# Patient Record
Sex: MALE | Race: WHITE | NOT HISPANIC OR LATINO | Employment: OTHER | URBAN - METROPOLITAN AREA
[De-identification: names, ages, dates, MRNs, and addresses within clinical notes are randomized per-mention and may not be internally consistent; named-entity substitution may affect disease eponyms.]

---

## 2017-04-28 ENCOUNTER — OFFICE VISIT (OUTPATIENT)
Dept: NEUROSURGERY | Facility: CLINIC | Age: 62
End: 2017-04-28

## 2017-04-28 VITALS
SYSTOLIC BLOOD PRESSURE: 150 MMHG | DIASTOLIC BLOOD PRESSURE: 90 MMHG | HEART RATE: 53 BPM | HEIGHT: 68 IN | BODY MASS INDEX: 24.71 KG/M2 | WEIGHT: 163 LBS

## 2017-04-28 DIAGNOSIS — M25.551 CHRONIC RIGHT HIP PAIN: Primary | ICD-10-CM

## 2017-04-28 DIAGNOSIS — G89.29 CHRONIC RIGHT HIP PAIN: Primary | ICD-10-CM

## 2017-04-28 DIAGNOSIS — M51.37 DEGENERATION OF LUMBAR OR LUMBOSACRAL INTERVERTEBRAL DISC: ICD-10-CM

## 2017-04-28 PROBLEM — M51.379 DEGENERATION OF LUMBAR OR LUMBOSACRAL INTERVERTEBRAL DISC: Status: ACTIVE | Noted: 2017-04-28

## 2017-04-28 PROCEDURE — 99213 OFFICE O/P EST LOW 20 MIN: CPT | Performed by: PHYSICIAN ASSISTANT

## 2017-04-28 NOTE — PROGRESS NOTES
Subjective   Patient ID: Duane Hemphill is a 61 y.o. male is here today for follow-up for back pain. He is 6 months out from an right L4-5 lumbar decompression by Dr. Rasheed 10/3/16.  He denies any recent cause or injury.  He complains of constant back and right hip pain. He is not taking any pain medications at this time.     Back Pain   This is a chronic problem. The current episode started more than 1 year ago. The problem has been gradually worsening since onset. The quality of the pain is described as aching. The pain radiates to the right thigh. The pain is at a severity of 8/10. The pain is severe. The pain is worse during the night. The symptoms are aggravated by position. Stiffness is present at night. Pertinent negatives include no bladder incontinence, bowel incontinence, fever, leg pain, numbness, tingling or weakness. He has tried NSAIDs, walking, home exercises, heat and analgesics for the symptoms. The treatment provided no relief.   Hip Pain    There was no injury mechanism. The pain is present in the right hip. The pain is at a severity of 7/10. The pain is moderate. The pain has been constant since onset. Pertinent negatives include no numbness or tingling. He has tried nothing for the symptoms.       The following portions of the patient's history were reviewed and updated as appropriate: allergies, current medications, past family history, past medical history, past social history, past surgical history and problem list.    Review of Systems   Constitutional: Negative for fever.   Gastrointestinal: Negative for bowel incontinence.   Genitourinary: Negative for bladder incontinence and difficulty urinating.   Musculoskeletal: Positive for back pain (right hip). Negative for gait problem.   Neurological: Negative for tingling, weakness and numbness.   All other systems reviewed and are negative.      Objective   Physical Exam   Constitutional: He is oriented to person, place, and time. He  appears well-developed and well-nourished.   HENT:   Head: Normocephalic and atraumatic.   Right Ear: External ear normal.   Left Ear: External ear normal.   Eyes: Conjunctivae and EOM are normal. Pupils are equal, round, and reactive to light. Right eye exhibits no discharge. Left eye exhibits no discharge.   Neck: Normal range of motion. Neck supple. No tracheal deviation present.   Pulmonary/Chest: Effort normal. No stridor. No respiratory distress.   Musculoskeletal: Normal range of motion. He exhibits no edema, tenderness or deformity.   Positive Dayton's sign on R side   Neurological: He is alert and oriented to person, place, and time. He has normal strength and normal reflexes. He displays no atrophy, no tremor and normal reflexes. No cranial nerve deficit or sensory deficit. He exhibits normal muscle tone. He displays a negative Romberg sign. He displays no seizure activity. Coordination and gait normal.   No long tract signs   Skin: Skin is warm and dry.   Psychiatric: He has a normal mood and affect. His behavior is normal. Judgment and thought content normal.   Nursing note and vitals reviewed.    Neurologic Exam     Mental Status   Oriented to person, place, and time.     Cranial Nerves     CN III, IV, VI   Pupils are equal, round, and reactive to light.  Extraocular motions are normal.     Motor Exam     Strength   Strength 5/5 throughout.       Assessment/Plan   Independent Review of Radiographic Studies:      Medical Decision Making:    Mr. Hemphill underwent a previous open right L4-L5 decompression with Dr. Rasheed.  He has always had chronic low back pain that has failed to improve over the years with therapy or epidurals.  He also reports chronic right groin pain that began after a right proximal femur fracture about 4 years ago.  He had surgery with Dr. Freeman and has had subsequent follow-up visits where he was reassured that the hardware looked okay but the patient continues to complain of  pain.  He no longer has any radicular pain like he had prior to the lumbar decompression.  He denies any weakness or incontinence but came back today because the back and groin pain continue to limit his activity.    I will send him for a new L spine MRI and XR.  I will also send him for a 2nd ortho opinion for his R hip pain. F/u thereafter.     Duane was seen today for back pain and hip pain.    Diagnoses and all orders for this visit:    Chronic right hip pain  -     Ambulatory Referral to Orthopedic Surgery  -     Basic Metabolic Panel    Degeneration of lumbar or lumbosacral intervertebral disc  -     MRI Lumbar Spine With & Without Contrast; Future  -     XR Spine Lumbar Complete With Flex & Ext; Future  -     Basic Metabolic Panel    Return for follow up after radiology test with Dr. Rasheed.

## 2017-05-24 ENCOUNTER — OFFICE VISIT (OUTPATIENT)
Dept: ORTHOPEDIC SURGERY | Facility: CLINIC | Age: 62
End: 2017-05-24

## 2017-05-24 DIAGNOSIS — M51.37 DEGENERATION OF LUMBAR OR LUMBOSACRAL INTERVERTEBRAL DISC: ICD-10-CM

## 2017-05-24 DIAGNOSIS — G89.29 CHRONIC RIGHT HIP PAIN: ICD-10-CM

## 2017-05-24 DIAGNOSIS — M25.551 CHRONIC RIGHT HIP PAIN: ICD-10-CM

## 2017-05-24 DIAGNOSIS — S72.001S: ICD-10-CM

## 2017-05-24 DIAGNOSIS — M25.551 HIP PAIN, RIGHT: Primary | ICD-10-CM

## 2017-05-24 PROCEDURE — 73502 X-RAY EXAM HIP UNI 2-3 VIEWS: CPT | Performed by: ORTHOPAEDIC SURGERY

## 2017-05-24 PROCEDURE — 99214 OFFICE O/P EST MOD 30 MIN: CPT | Performed by: ORTHOPAEDIC SURGERY

## 2017-05-24 RX ORDER — TRAMADOL HYDROCHLORIDE 50 MG/1
50 TABLET ORAL NIGHTLY PRN
Qty: 30 TABLET | Refills: 0 | Status: SHIPPED | OUTPATIENT
Start: 2017-05-24 | End: 2017-07-03

## 2017-07-03 ENCOUNTER — OFFICE VISIT (OUTPATIENT)
Dept: NEUROSURGERY | Facility: CLINIC | Age: 62
End: 2017-07-03

## 2017-07-03 VITALS
HEART RATE: 56 BPM | WEIGHT: 168 LBS | DIASTOLIC BLOOD PRESSURE: 94 MMHG | HEIGHT: 68 IN | SYSTOLIC BLOOD PRESSURE: 169 MMHG | BODY MASS INDEX: 25.46 KG/M2

## 2017-07-03 DIAGNOSIS — G89.29 CHRONIC RIGHT HIP PAIN: Primary | ICD-10-CM

## 2017-07-03 DIAGNOSIS — M25.551 CHRONIC RIGHT HIP PAIN: Primary | ICD-10-CM

## 2017-07-03 DIAGNOSIS — M48.061 STENOSIS, SPINAL, LUMBAR: ICD-10-CM

## 2017-07-03 PROCEDURE — 99213 OFFICE O/P EST LOW 20 MIN: CPT | Performed by: NEUROLOGICAL SURGERY

## 2017-07-03 RX ORDER — GABAPENTIN 600 MG/1
600 TABLET ORAL 2 TIMES DAILY
Qty: 180 TABLET | Refills: 1
Start: 2017-07-03 | End: 2017-09-08 | Stop reason: SDUPTHER

## 2017-07-03 NOTE — PATIENT INSTRUCTIONS
Steps to Quit Smoking   Smoking tobacco can be harmful to your health and can affect almost every organ in your body. Smoking puts you, and those around you, at risk for developing many serious chronic diseases. Quitting smoking is difficult, but it is one of the best things that you can do for your health. It is never too late to quit.  WHAT ARE THE BENEFITS OF QUITTING SMOKING?  When you quit smoking, you lower your risk of developing serious diseases and conditions, such as:  · Lung cancer or lung disease, such as COPD.  · Heart disease.  · Stroke.  · Heart attack.  · Infertility.  · Osteoporosis and bone fractures.  Additionally, symptoms such as coughing, wheezing, and shortness of breath may get better when you quit. You may also find that you get sick less often because your body is stronger at fighting off colds and infections. If you are pregnant, quitting smoking can help to reduce your chances of having a baby of low birth weight.  HOW DO I GET READY TO QUIT?  When you decide to quit smoking, create a plan to make sure that you are successful. Before you quit:  · Pick a date to quit. Set a date within the next two weeks to give you time to prepare.  · Write down the reasons why you are quitting. Keep this list in places where you will see it often, such as on your bathroom mirror or in your car or wallet.  · Identify the people, places, things, and activities that make you want to smoke (triggers) and avoid them. Make sure to take these actions:    Throw away all cigarettes at home, at work, and in your car.    Throw away smoking accessories, such as ashtrays and lighters.    Clean your car and make sure to empty the ashtray.    Clean your home, including curtains and carpets.  · Tell your family, friends, and coworkers that you are quitting. Support from your loved ones can make quitting easier.  · Talk with your health care provider about your options for quitting smoking.  · Find out what treatment  "options are covered by your health insurance.  WHAT STRATEGIES CAN I USE TO QUIT SMOKING?   Talk with your healthcare provider about different strategies to quit smoking. Some strategies include:  · Quitting smoking altogether instead of gradually lessening how much you smoke over a period of time. Research shows that quitting \"cold turkey\" is more successful than gradually quitting.  · Attending in-person counseling to help you build problem-solving skills. You are more likely to have success in quitting if you attend several counseling sessions. Even short sessions of 10 minutes can be effective.  · Finding resources and support systems that can help you to quit smoking and remain smoke-free after you quit. These resources are most helpful when you use them often. They can include:    Online chats with a counselor.    Telephone quitlines.    Printed self-help materials.    Support groups or group counseling.    Text messaging programs.    Mobile phone applications.  · Taking medicines to help you quit smoking. (If you are pregnant or breastfeeding, talk with your health care provider first.) Some medicines contain nicotine and some do not. Both types of medicines help with cravings, but the medicines that include nicotine help to relieve withdrawal symptoms. Your health care provider may recommend:    Nicotine patches, gum, or lozenges.    Nicotine inhalers or sprays.    Non-nicotine medicine that is taken by mouth.  Talk with your health care provider about combining strategies, such as taking medicines while you are also receiving in-person counseling. Using these two strategies together makes you more likely to succeed in quitting than if you used either strategy on its own.  If you are pregnant or breastfeeding, talk with your health care provider about finding counseling or other support strategies to quit smoking. Do not take medicine to help you quit smoking unless told to do so by your health care " provider.  WHAT THINGS CAN I DO TO MAKE IT EASIER TO QUIT?  Quitting smoking might feel overwhelming at first, but there is a lot that you can do to make it easier. Take these important actions:  · Reach out to your family and friends and ask that they support and encourage you during this time. Call telephone quitlines, reach out to support groups, or work with a counselor for support.  · Ask people who smoke to avoid smoking around you.  · Avoid places that trigger you to smoke, such as bars, parties, or smoke-break areas at work.  · Spend time around people who do not smoke.  · Lessen stress in your life, because stress can be a smoking trigger for some people. To lessen stress, try:    Exercising regularly.    Deep-breathing exercises.    Yoga.    Meditating.    Performing a body scan. This involves closing your eyes, scanning your body from head to toe, and noticing which parts of your body are particularly tense. Purposefully relax the muscles in those areas.  · Download or purchase mobile phone or tablet apps (applications) that can help you stick to your quit plan by providing reminders, tips, and encouragement. There are many free apps, such as QuitGuide from the CDC (Centers for Disease Control and Prevention). You can find other support for quitting smoking (smoking cessation) through smokefree.gov and other websites.  HOW WILL I FEEL WHEN I QUIT SMOKING?  Within the first 24 hours of quitting smoking, you may start to feel some withdrawal symptoms. These symptoms are usually most noticeable 2-3 days after quitting, but they usually do not last beyond 2-3 weeks. Changes or symptoms that you might experience include:  · Mood swings.  · Restlessness, anxiety, or irritation.  · Difficulty concentrating.  · Dizziness.  · Strong cravings for sugary foods in addition to nicotine.  · Mild weight gain.  · Constipation.  · Nausea.  · Coughing or a sore throat.  · Changes in how your medicines work in your  body.  · A depressed mood.  · Difficulty sleeping (insomnia).  After the first 2-3 weeks of quitting, you may start to notice more positive results, such as:  · Improved sense of smell and taste.  · Decreased coughing and sore throat.  · Slower heart rate.  · Lower blood pressure.  · Clearer skin.  · The ability to breathe more easily.  · Fewer sick days.  Quitting smoking is very challenging for most people. Do not get discouraged if you are not successful the first time. Some people need to make many attempts to quit before they achieve long-term success. Do your best to stick to your quit plan, and talk with your health care provider if you have any questions or concerns.     This information is not intended to replace advice given to you by your health care provider. Make sure you discuss any questions you have with your health care provider.     Document Released: 12/12/2002 Document Revised: 05/03/2016 Document Reviewed: 05/03/2016  [employer] Interactive Patient Education ©2017 [employer]

## 2017-07-03 NOTE — PROGRESS NOTES
Subjective   Patient ID: Duane Hemphill is a 61 y.o. male is here today for follow-up of back pain after right L4-5 lumbar decompression on 10/3/16 with new MRI of the lumbar spine.    At the patient's last visit, due to his back and right hip pain, new MRI of the lumbar spine was ordered and the patient was given a referral to PT.    Today, the patient notes that despite the PT, his pain symptoms are unchanged.  He continues to have a lot of pain in his back and right hip.    The patient notes that he has spoken with two orthopedic surgeons and was told that his symptoms are not coming from his right hip.    Back Pain   This is a chronic problem. The current episode started more than 1 year ago. The problem occurs constantly. The problem has been gradually worsening since onset. The pain is present in the gluteal and sacro-iliac. The quality of the pain is described as aching, burning, cramping, shooting and stabbing. The pain radiates to the right thigh. The pain is at a severity of 8/10. The pain is the same all the time. The symptoms are aggravated by bending, lying down, sitting, standing, stress and twisting. Stiffness is present all day. Associated symptoms include pelvic pain and weakness. Pertinent negatives include no abdominal pain, bladder incontinence, bowel incontinence, chest pain, dysuria, fever, headaches, leg pain, numbness, paresis, paresthesias, perianal numbness, tingling or weight loss.       The following portions of the patient's history were reviewed and updated as appropriate: allergies, current medications, past family history, past medical history, past social history, past surgical history and problem list.    Review of Systems   Constitutional: Negative for fever and weight loss.   Cardiovascular: Negative for chest pain.   Gastrointestinal: Negative for abdominal pain and bowel incontinence.   Genitourinary: Positive for pelvic pain. Negative for bladder incontinence and dysuria.    Musculoskeletal: Positive for back pain.   Neurological: Positive for weakness. Negative for tingling, numbness, headaches and paresthesias.   All other systems reviewed and are negative.    He is with his wife.  He had a right open L4-L5 decompression done about 9 months ago.  He did quite well but he began having some right upper buttock pain.  Initially was thought that he could be related to some previous hip surgery that he had 12 years ago.  But he has seen his orthopedic surgeon and another one who think that the hip is fine.  The pain doesn't radiate down the leg like it used to.  It's in the upper buttock predominantly.  Bending forward does seem to make it worse, leaning backward does not.  He doesn't have any motor deficits.  He still actually doesn't have that much low back pain and the left leg is fine.  We reviewed the MRI.  Is not clear cut.  There could be some recurrent stenosis under imaged on the MRI.  He has seen another orthopedic surgeon who agreed with his primary orthopedic surgeon.  I think it's reasonable to assume as the spine for the moment and try epidural blocks.  He had been on gabapentin which I gave him.  I told him to resume it.  His wife will let us know the precise dose.  If that doesn't work, then we will proceed with myelography.  But if we do another surgery and it is at L4-L5, he would probably need to be fused which is a bigger surgery.  I told him as such.        Objective   Physical Exam   Constitutional: He is oriented to person, place, and time. He appears well-developed and well-nourished.   HENT:   Head: Normocephalic and atraumatic.   Eyes: Conjunctivae and EOM are normal. Pupils are equal, round, and reactive to light.   Fundoscopic exam:       The right eye shows no papilledema. The right eye shows venous pulsations.        The left eye shows no papilledema. The left eye shows venous pulsations.   Neck: Carotid bruit is not present.   Neurological: He is oriented  to person, place, and time. He has a normal Finger-Nose-Finger Test and a normal Heel to Shin Test. Gait normal.   Reflex Scores:       Tricep reflexes are 2+ on the right side and 2+ on the left side.       Bicep reflexes are 2+ on the right side and 2+ on the left side.       Brachioradialis reflexes are 2+ on the right side and 2+ on the left side.       Patellar reflexes are 2+ on the right side and 2+ on the left side.       Achilles reflexes are 2+ on the right side and 2+ on the left side.  Psychiatric: His speech is normal.     Neurologic Exam     Mental Status   Oriented to person, place, and time.   Registration of memory: Good recent and remote memory.   Attention: normal. Concentration: normal.   Speech: speech is normal   Level of consciousness: alert  Knowledge: consistent with education.     Cranial Nerves     CN II   Visual fields full to confrontation.   Visual acuity: normal    CN III, IV, VI   Pupils are equal, round, and reactive to light.  Extraocular motions are normal.     CN V   Facial sensation intact.   Right corneal reflex: normal  Left corneal reflex: normal    CN VII   Facial expression full, symmetric.   Right facial weakness: none  Left facial weakness: none    CN VIII   Hearing: intact    CN IX, X   Palate: symmetric    CN XI   Right sternocleidomastoid strength: normal  Left sternocleidomastoid strength: normal    CN XII   Tongue: not atrophic  Tongue deviation: none    Motor Exam   Muscle bulk: normal  Right arm tone: normal  Left arm tone: normal  Right leg tone: normal  Left leg tone: normal    Strength   Strength 5/5 except as noted.     Sensory Exam   Light touch normal.     Gait, Coordination, and Reflexes     Gait  Gait: normal    Coordination   Finger to nose coordination: normal  Heel to shin coordination: normal    Reflexes   Right brachioradialis: 2+  Left brachioradialis: 2+  Right biceps: 2+  Left biceps: 2+  Right triceps: 2+  Left triceps: 2+  Right patellar: 2+  Left  patellar: 2+  Right achilles: 2+  Left achilles: 2+  Right : 2+  Left : 2+      Assessment/Plan   Independent Review of Radiographic Studies:    I reviewed a lumbar MRI which shows postoperative changes on the right at L4-L5.  Perhaps some degree of residual stenosis at L4-L5.  Significant nerve compression in other places.  X-rays don't show any obvious instability.      Medical Decision Making:    I'm not actually certain his pain in the right buttock is coming from the spine.  The MRI evidence isn't convincing.  We will assume that it is for the moment and try epidural blocks again.  He will restart the gabapentin.  His wife will let us know what the dose is.  He had gotten it from me in the past.  We'll see him in 2 months.  If there is no improvement, then we'll proceed with myelography and see if there is any more significant stenosis is not seen on the MRI.      Duane was seen today for back pain.    Diagnoses and all orders for this visit:    Chronic right hip pain    Stenosis, spinal, lumbar  -     Epidural Block    Return in about 2 months (around 9/3/2017).    I advised the patient of the risks in continuing to use tobacco, and I provided this patient with smoking cessation educational materials.  I also discussed how to quit smoking and the patient has expressed the willingness to quit.      During this visit, I spent less than 3 minutes counseling the patient regarding smoking cessation.

## 2017-07-20 ENCOUNTER — HOSPITAL ENCOUNTER (OUTPATIENT)
Dept: GENERAL RADIOLOGY | Facility: HOSPITAL | Age: 62
Discharge: HOME | End: 2017-07-20

## 2017-07-20 ENCOUNTER — HOSPITAL ENCOUNTER (OUTPATIENT)
Dept: PAIN MEDICINE | Facility: HOSPITAL | Age: 62
Discharge: HOME | End: 2017-07-20
Attending: NEUROLOGICAL SURGERY | Admitting: NEUROLOGICAL SURGERY

## 2017-07-20 ENCOUNTER — ANESTHESIA EVENT (OUTPATIENT)
Dept: PAIN MEDICINE | Facility: HOSPITAL | Age: 62
End: 2017-07-20

## 2017-07-20 ENCOUNTER — ANESTHESIA (OUTPATIENT)
Dept: PAIN MEDICINE | Facility: HOSPITAL | Age: 62
End: 2017-07-20

## 2017-07-20 VITALS
OXYGEN SATURATION: 100 % | RESPIRATION RATE: 16 BRPM | TEMPERATURE: 97.6 F | BODY MASS INDEX: 25.03 KG/M2 | SYSTOLIC BLOOD PRESSURE: 162 MMHG | HEIGHT: 69 IN | WEIGHT: 169 LBS | HEART RATE: 57 BPM | DIASTOLIC BLOOD PRESSURE: 112 MMHG

## 2017-07-20 DIAGNOSIS — R52 PAIN: ICD-10-CM

## 2017-07-20 DIAGNOSIS — M48.061 STENOSIS, SPINAL, LUMBAR: ICD-10-CM

## 2017-07-20 PROCEDURE — 25010000002 METHYLPREDNISOLONE PER 80 MG: Performed by: ANESTHESIOLOGY

## 2017-07-20 PROCEDURE — 0 IOPAMIDOL 41 % SOLUTION: Performed by: ANESTHESIOLOGY

## 2017-07-20 PROCEDURE — 77003 FLUOROGUIDE FOR SPINE INJECT: CPT

## 2017-07-20 PROCEDURE — C1755 CATHETER, INTRASPINAL: HCPCS

## 2017-07-20 RX ORDER — METHYLPREDNISOLONE ACETATE 80 MG/ML
80 INJECTION, SUSPENSION INTRA-ARTICULAR; INTRALESIONAL; INTRAMUSCULAR; SOFT TISSUE ONCE
Status: COMPLETED | OUTPATIENT
Start: 2017-07-20 | End: 2017-07-20

## 2017-07-20 RX ORDER — FENTANYL CITRATE 50 UG/ML
50 INJECTION, SOLUTION INTRAMUSCULAR; INTRAVENOUS
Status: DISCONTINUED | OUTPATIENT
Start: 2017-07-20 | End: 2017-07-21 | Stop reason: HOSPADM

## 2017-07-20 RX ORDER — SODIUM CHLORIDE 0.9 % (FLUSH) 0.9 %
1-10 SYRINGE (ML) INJECTION AS NEEDED
Status: DISCONTINUED | OUTPATIENT
Start: 2017-07-20 | End: 2017-07-21 | Stop reason: HOSPADM

## 2017-07-20 RX ORDER — LIDOCAINE HYDROCHLORIDE 10 MG/ML
1 INJECTION, SOLUTION INFILTRATION; PERINEURAL ONCE
Status: DISCONTINUED | OUTPATIENT
Start: 2017-07-20 | End: 2017-07-21 | Stop reason: HOSPADM

## 2017-07-20 RX ORDER — MIDAZOLAM HYDROCHLORIDE 1 MG/ML
1 INJECTION INTRAMUSCULAR; INTRAVENOUS
Status: DISCONTINUED | OUTPATIENT
Start: 2017-07-20 | End: 2017-07-21 | Stop reason: HOSPADM

## 2017-07-20 RX ADMIN — METHYLPREDNISOLONE ACETATE 80 MG: 80 INJECTION, SUSPENSION INTRA-ARTICULAR; INTRALESIONAL; INTRAMUSCULAR; SOFT TISSUE at 12:37

## 2017-07-20 RX ADMIN — IOPAMIDOL 10 ML: 408 INJECTION, SOLUTION INTRATHECAL at 12:37

## 2017-07-20 NOTE — PLAN OF CARE
Problem: Pain, Chronic (Adult)  Goal: Acceptable Pain Control/Comfort Level  Outcome: Unable to achieve outcome(s) by discharge Date Met:  07/20/17

## 2017-07-20 NOTE — PLAN OF CARE
Problem: Pain, Chronic (Adult)  Goal: Identify Related Risk Factors and Signs and Symptoms  Outcome: Unable to achieve outcome(s) by discharge Date Met:  07/20/17

## 2017-07-20 NOTE — ANESTHESIA PROCEDURE NOTES
PAIN Epidural block    Patient location during procedure: pain clinic  Indication:procedure for pain  Performed By  Anesthesiologist: FRANCISCO FOLEY  Preanesthetic Checklist  Completed: patient identified and risks and benefits discussed  Additional Notes  Diagnosis:     Lumbar Spinal Stenosis without neurogenic claudication  lumbago    Sedation:      A lumbar epidural steroid injection with fluoroscopic guidance and an Isovue dye epidurogram was performed.  Under fluoroscopic guidance, the epidural space was identified and accessed, confirmed by loss of resistance to saline followed by injection of Isovue dye, which shows a good epidurogram.  The above medications were injected uneventfully.    Prep:  Pt Position:prone  Sterile Tech:cap, gloves, mask and sterile barrier  Monitoring:blood pressure monitoring, continuous pulse oximetry and EKG  Procedure:  Approach:midline  Guidance: fluoroscopy  Location:lumbar  Level:4-5  Needle Type:Tuohy  Needle Gauge:20  Aspiration:negative  Medications:  Depomedrol:80  Preservative Free Saline:3mL  Isovue:2mL    Post Assessment:  Pt Tolerance:patient tolerated the procedure well with no apparent complications  Complications:no

## 2017-07-20 NOTE — H&P
CHIEF COMPLAINT: Back pain      HISTORY OF PRESENT ILLNESS:  Last October he underwent a L4 5 decompression with Dr. Rasheed.  At that point his right leg radicular symptoms went away.  But his back symptoms of pain persisted.  He currently describes low lumbar back pain without radiation.  Constant in timing.  Between a 7 and 10 on the pain scale.  Deep pressure stabbing in nature.  Worse with activity.  It's affecting his sleep his mood and his appetite.  Taking Neurontin and doing home exercises with some relief.  His MRI shows some spinal stenosis.    PAST MEDICAL HISTORY:  No allergies  Medications include Neurontin  History of hypertension, L4 5 laminectomy    SOCIAL HISTORY:  He does not smoke, he chews on cigars    REVIEW OF SYSTEMS:  No hematologic infectious or constitutional symptoms    PHYSICAL EXAM:  168/97 pulse 52 respirations 16 sat 100% temp 36.4 height 5 foot nonweight 169 pounds BMI 24.9  Well-developed well-nourished no acute distress  Extra ocular movements intact  Mallampati class II airway  Cardiac:  Regular rate and rhythm  Lungs:  Clear to auscultation bilaterally with good effort  Alert and oriented ×3  Deep tendon reflexes increased in the bilateral patella  Positive straight leg raise bilaterally  5 out of 5 strength bilateral upper and lower extremities  Lumbar spine with well-healed scar but no other obvious deformities  Lumbar spine tender to palpation      DIAGNOSIS:  Lumbago  Lumbar Spinal Stenosis without neurogenic claudication      PLAN:  1.  Lumbar epidural steroid injections, up to 3, spaced 1-2 weeks apart.  If pain control is acceptable after 1 or 2 injections, it was discussed with the patient that they may return for the subsequent injections if and when their pain returns.  The risks were discussed with the patient including failure of relief, worsening pain, Headache (post dural puncture headache), bleeding (epidural hematoma) and infection (epidural abscess or skin  infection).  2.  Physical therapy exercises at home as prescribed by physical therapy or from the pain clinic handout (given to the patient).  Continuation of these exercises every day, or multiple times per week, even when the patient has good pain relief, was stressed to the patient as a preventative measure to decrease the frequency and severity of future pain episodes.  3.  Continue pain medicines as already prescribed.  If patient not currently taking any, it is recommended to begin Acetaminophen 1000 mg po q 8 hours.  If other medicines containing Acetaminophen are currently prescribed, maintain daily dose at 3000 mg.    4.  If they can tolerate NSAIDS, it is recommended to take Ibuprofen 600 mg po q 6 hours for 7 days during pain exacerbations.  Alternatively, they may substitute an NSAID of their choice (e.g. Aleve).  This may be taken at the same time as Acetaminophen.  5.  Heat and ice to the affected area as tolerated for pain control.  It was discussed that heating pads can cause burns.  6.  Daily low impact exercise such as walking or water exercise was recommended to maintain overall health and aid in weight control.   7.  Follow up as needed for subsequent injections.  8.  Patient was counseled to abstain from tobacco products.

## 2017-07-24 ENCOUNTER — TRANSCRIBE ORDERS (OUTPATIENT)
Dept: PAIN MEDICINE | Facility: HOSPITAL | Age: 62
End: 2017-07-24

## 2017-07-24 DIAGNOSIS — M48.061 STENOSIS, SPINAL, LUMBAR: Primary | ICD-10-CM

## 2017-08-18 ENCOUNTER — HOSPITAL ENCOUNTER (OUTPATIENT)
Dept: PAIN MEDICINE | Facility: HOSPITAL | Age: 62
Discharge: HOME | End: 2017-08-18
Admitting: NEUROLOGICAL SURGERY

## 2017-08-18 ENCOUNTER — HOSPITAL ENCOUNTER (OUTPATIENT)
Dept: GENERAL RADIOLOGY | Facility: HOSPITAL | Age: 62
Discharge: HOME | End: 2017-08-18

## 2017-08-18 ENCOUNTER — ANESTHESIA EVENT (OUTPATIENT)
Dept: PAIN MEDICINE | Facility: HOSPITAL | Age: 62
End: 2017-08-18

## 2017-08-18 ENCOUNTER — ANESTHESIA (OUTPATIENT)
Dept: PAIN MEDICINE | Facility: HOSPITAL | Age: 62
End: 2017-08-18

## 2017-08-18 VITALS
OXYGEN SATURATION: 97 % | DIASTOLIC BLOOD PRESSURE: 104 MMHG | BODY MASS INDEX: 25.03 KG/M2 | SYSTOLIC BLOOD PRESSURE: 158 MMHG | TEMPERATURE: 97.4 F | HEART RATE: 63 BPM | RESPIRATION RATE: 16 BRPM | WEIGHT: 169 LBS | HEIGHT: 69 IN

## 2017-08-18 DIAGNOSIS — M48.061 STENOSIS, SPINAL, LUMBAR: ICD-10-CM

## 2017-08-18 DIAGNOSIS — R52 PAIN: ICD-10-CM

## 2017-08-18 PROCEDURE — 25010000002 METHYLPREDNISOLONE PER 80 MG: Performed by: ANESTHESIOLOGY

## 2017-08-18 PROCEDURE — C1755 CATHETER, INTRASPINAL: HCPCS

## 2017-08-18 PROCEDURE — 77003 FLUOROGUIDE FOR SPINE INJECT: CPT

## 2017-08-18 RX ORDER — SODIUM CHLORIDE 0.9 % (FLUSH) 0.9 %
1-10 SYRINGE (ML) INJECTION AS NEEDED
Status: DISCONTINUED | OUTPATIENT
Start: 2017-08-18 | End: 2017-08-19 | Stop reason: HOSPADM

## 2017-08-18 RX ORDER — FENTANYL CITRATE 50 UG/ML
50 INJECTION, SOLUTION INTRAMUSCULAR; INTRAVENOUS
Status: DISCONTINUED | OUTPATIENT
Start: 2017-08-18 | End: 2017-08-19 | Stop reason: HOSPADM

## 2017-08-18 RX ORDER — MIDAZOLAM HYDROCHLORIDE 1 MG/ML
1 INJECTION INTRAMUSCULAR; INTRAVENOUS
Status: DISCONTINUED | OUTPATIENT
Start: 2017-08-18 | End: 2017-08-19 | Stop reason: HOSPADM

## 2017-08-18 RX ORDER — METHYLPREDNISOLONE ACETATE 80 MG/ML
80 INJECTION, SUSPENSION INTRA-ARTICULAR; INTRALESIONAL; INTRAMUSCULAR; SOFT TISSUE ONCE
Status: COMPLETED | OUTPATIENT
Start: 2017-08-18 | End: 2017-08-18

## 2017-08-18 RX ORDER — LIDOCAINE HYDROCHLORIDE 10 MG/ML
1 INJECTION, SOLUTION INFILTRATION; PERINEURAL ONCE
Status: DISCONTINUED | OUTPATIENT
Start: 2017-08-18 | End: 2017-08-19 | Stop reason: HOSPADM

## 2017-08-18 RX ADMIN — METHYLPREDNISOLONE ACETATE 80 MG: 80 INJECTION, SUSPENSION INTRA-ARTICULAR; INTRALESIONAL; INTRAMUSCULAR; SOFT TISSUE at 08:49

## 2017-08-18 NOTE — ANESTHESIA PROCEDURE NOTES
PAIN Epidural block    Patient location during procedure: pain clinic  Indication:procedure for pain  Performed By  Anesthesiologist: FRANCISCO FOLEY  Preanesthetic Checklist  Completed: patient identified and risks and benefits discussed  Additional Notes  Diagnosis:     Lumbar Spinal Stenosis without neurogenic claudication  Lumbago      Sedation:  none    A lumbar epidural steroid injection with fluoroscopic guidance was performed.  Under fluoroscopic guidance, the epidural space was identified and accessed, confirmed by loss of resistance to saline.  The above medications were injected uneventfully.    Prep:  Pt Position:prone  Sterile Tech:cap, gloves, mask and sterile barrier  Monitoring:blood pressure monitoring, continuous pulse oximetry and EKG  Procedure:  Approach:right paramedian  Guidance: fluoroscopy  Location:lumbar  Level:4-5  Needle Type:Tuohy  Needle Gauge:20  Aspiration:negative  Medications:  Depomedrol:80  Preservative Free Saline:3mL    Post Assessment:  Pt Tolerance:patient tolerated the procedure well with no apparent complications  Complications:no

## 2017-08-18 NOTE — H&P
INTERVAL HISTORY:    The patient returns for another Lumbar epidural steroid injection today.  He had received 100% relief for 3 days after his previous injection but is now back to baseline with a pain scale is 6 out of 10.  He also had a post dural puncture headache for 1 week after his previous injection which has now completely resolved.  He has not been doing physical therapy but he is taking Neurontin.  Met is giving him some relief.  His MRI shows spinal stenosis     Exam:  Airway Mallampatti 2  Alert and oriented      Diagnosis:  Lumbar Spinal Stenosis without neurogenic claudication  Lumbago    Plan:  Lumbar epidural steroid injection under fluoroscopic guidance    I have encouraged them to continue:  1.  Physical therapy exercises at home as prescribed by physical therapy or from the pain clinic handout (given to the patient).  Continuation of these exercises every day, or multiple times per week, even when the patient has good pain relief, was stressed to the patient as a preventative measure to decrease the frequency and severity of future pain episodes.  2.  Continue pain medicines as already prescribed.  If patient not currently taking any, it is recommended to begin Acetaminophen 1000 mg po q 8 hours.  If other medicines containing Acetaminophen are currently prescribed, maintain daily dose at 3000mg.    3.  If they can tolerate NSAIDS, it is recommended to take Ibuprofen 600 mg po q 6 hours for 7 days during pain exacerbations.   Alternatively, they may substitute an NSAID of their choice (e.g. Aleve)  4.  Heat and ice to the affected area as tolerated for pain control.  It was discussed that heating pads can cause burns.  5.  Low impact exercise such as walking or water exercise was recommended to maintain overall health and aid in weight control.   6.  Follow up as needed for subsequent injections.  7.  Patient was counseled to abstain from tobacco products.

## 2017-09-08 ENCOUNTER — OFFICE VISIT (OUTPATIENT)
Dept: NEUROSURGERY | Facility: CLINIC | Age: 62
End: 2017-09-08

## 2017-09-08 VITALS
BODY MASS INDEX: 25.03 KG/M2 | SYSTOLIC BLOOD PRESSURE: 140 MMHG | DIASTOLIC BLOOD PRESSURE: 87 MMHG | HEART RATE: 86 BPM | WEIGHT: 169 LBS | HEIGHT: 69 IN

## 2017-09-08 DIAGNOSIS — M25.551 CHRONIC RIGHT HIP PAIN: ICD-10-CM

## 2017-09-08 DIAGNOSIS — M51.37 DEGENERATION OF LUMBAR OR LUMBOSACRAL INTERVERTEBRAL DISC: Primary | ICD-10-CM

## 2017-09-08 DIAGNOSIS — G89.29 CHRONIC RIGHT HIP PAIN: ICD-10-CM

## 2017-09-08 DIAGNOSIS — M48.061 STENOSIS, SPINAL, LUMBAR: ICD-10-CM

## 2017-09-08 PROCEDURE — 99213 OFFICE O/P EST LOW 20 MIN: CPT | Performed by: NEUROLOGICAL SURGERY

## 2017-09-08 RX ORDER — GABAPENTIN 600 MG/1
600 TABLET ORAL 2 TIMES DAILY
Qty: 180 TABLET | Refills: 1 | Status: SHIPPED | OUTPATIENT
Start: 2017-09-08 | End: 2018-05-08 | Stop reason: SDUPTHER

## 2017-09-08 NOTE — PROGRESS NOTES
Subjective   Patient ID: Duane Hemphill is a 61 y.o. male is here today for follow-up on back pain that radiates into the right hip. At the last visit patient stated that his symptoms were the same even after having PT.    Today the patient states that he has had an epidural and it has given him some relief. He is currently taking Gabapentin 600 mg BID for pain.    Back Pain   This is a recurrent problem. The current episode started more than 1 month ago. The problem occurs daily. The pain is present in the lumbar spine. Radiates to: right hip. The pain is at a severity of 5/10. The pain is moderate. Associated symptoms include leg pain (right hip). Pertinent negatives include no bladder incontinence, bowel incontinence, numbness or tingling.       The following portions of the patient's history were reviewed and updated as appropriate: allergies, current medications, past family history, past medical history, past social history, past surgical history and problem list.    Review of Systems   Gastrointestinal: Negative for bowel incontinence.   Genitourinary: Negative for bladder incontinence.   Musculoskeletal: Positive for back pain.   Neurological: Negative for tingling and numbness.   All other systems reviewed and are negative.    He had 2 epidural blocks and they did seem to help.  He still has some pain but it is better than before.  He remains on Gabapentin at 600 mg b.i.d. which we will refill today.  He still has pain in his back and his right buttock but it is more manageable. I suggested he try using the weight-lifting belt to offload his spine in the work place.  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  We will have him get his 3rd block which is scheduled for a few months, continue the Gabapentin and come to see me in 3 months.        Objective   Physical Exam   Constitutional: He is oriented to person, place, and time. He appears well-developed and well-nourished.   HENT:   Head:  Normocephalic and atraumatic.   Eyes: Conjunctivae and EOM are normal. Pupils are equal, round, and reactive to light.   Fundoscopic exam:       The right eye shows no papilledema. The right eye shows venous pulsations.        The left eye shows no papilledema. The left eye shows venous pulsations.   Neck: Carotid bruit is not present.   Neurological: He is oriented to person, place, and time. He has a normal Finger-Nose-Finger Test and a normal Heel to Shin Test. Gait normal.   Reflex Scores:       Tricep reflexes are 2+ on the right side and 2+ on the left side.       Bicep reflexes are 2+ on the right side and 2+ on the left side.       Brachioradialis reflexes are 2+ on the right side and 2+ on the left side.       Patellar reflexes are 2+ on the right side and 2+ on the left side.       Achilles reflexes are 2+ on the right side and 2+ on the left side.  Psychiatric: His speech is normal.     Neurologic Exam     Mental Status   Oriented to person, place, and time.   Registration of memory: Good recent and remote memory.   Attention: normal. Concentration: normal.   Speech: speech is normal   Level of consciousness: alert  Knowledge: consistent with education.     Cranial Nerves     CN II   Visual fields full to confrontation.   Visual acuity: normal    CN III, IV, VI   Pupils are equal, round, and reactive to light.  Extraocular motions are normal.     CN V   Facial sensation intact.   Right corneal reflex: normal  Left corneal reflex: normal    CN VII   Facial expression full, symmetric.   Right facial weakness: none  Left facial weakness: none    CN VIII   Hearing: intact    CN IX, X   Palate: symmetric    CN XI   Right sternocleidomastoid strength: normal  Left sternocleidomastoid strength: normal    CN XII   Tongue: not atrophic  Tongue deviation: none    Motor Exam   Muscle bulk: normal  Right arm tone: normal  Left arm tone: normal  Right leg tone: normal  Left leg tone: normal    Strength   Strength 5/5  except as noted.     Sensory Exam   Light touch normal.     Gait, Coordination, and Reflexes     Gait  Gait: normal    Coordination   Finger to nose coordination: normal  Heel to shin coordination: normal    Reflexes   Right brachioradialis: 2+  Left brachioradialis: 2+  Right biceps: 2+  Left biceps: 2+  Right triceps: 2+  Left triceps: 2+  Right patellar: 2+  Left patellar: 2+  Right achilles: 2+  Left achilles: 2+  Right : 2+  Left : 2+      Assessment/Plan   Independent Review of Radiographic Studies:    I reviewed the lumbar MRI that was done recently which showed postoperative changes on the right at L4-L5.  Perhaps some degree of residual stenosis at L4-L5.  The x-rays did not show any obvious instability.      Medical Decision Making:    He has made some progress.  He will continue to try and limit his activities in the work place.  He will get a weight-lifting belt at [employer] as I have suggested. He will get his 3rd block and continue his Gabapentin at 600 mg b.i.d. which I have refilled. We will see him in 3 months.  If he makes no progress, we will proceed with myelography.        Duane was seen today for back pain.    Diagnoses and all orders for this visit:    Degeneration of lumbar or lumbosacral intervertebral disc    Stenosis, spinal, lumbar    Chronic right hip pain    Other orders  -     gabapentin (NEURONTIN) 600 MG tablet; Take 1 tablet by mouth 2 (Two) Times a Day.    Return in about 3 months (around 12/8/2017).

## 2017-11-30 ENCOUNTER — TRANSCRIBE ORDERS (OUTPATIENT)
Dept: PAIN MEDICINE | Facility: HOSPITAL | Age: 62
End: 2017-11-30

## 2017-11-30 DIAGNOSIS — M51.36 DDD (DEGENERATIVE DISC DISEASE), LUMBAR: Primary | ICD-10-CM

## 2017-12-07 ENCOUNTER — HOSPITAL ENCOUNTER (OUTPATIENT)
Dept: GENERAL RADIOLOGY | Facility: HOSPITAL | Age: 62
Discharge: HOME | End: 2017-12-07

## 2017-12-07 ENCOUNTER — ANESTHESIA EVENT (OUTPATIENT)
Dept: PAIN MEDICINE | Facility: HOSPITAL | Age: 62
End: 2017-12-07

## 2017-12-07 ENCOUNTER — HOSPITAL ENCOUNTER (OUTPATIENT)
Dept: PAIN MEDICINE | Facility: HOSPITAL | Age: 62
Discharge: HOME | End: 2017-12-07
Admitting: NEUROLOGICAL SURGERY

## 2017-12-07 ENCOUNTER — ANESTHESIA (OUTPATIENT)
Dept: PAIN MEDICINE | Facility: HOSPITAL | Age: 62
End: 2017-12-07

## 2017-12-07 VITALS
SYSTOLIC BLOOD PRESSURE: 162 MMHG | TEMPERATURE: 97.9 F | DIASTOLIC BLOOD PRESSURE: 97 MMHG | OXYGEN SATURATION: 97 % | HEART RATE: 64 BPM | RESPIRATION RATE: 16 BRPM

## 2017-12-07 DIAGNOSIS — R52 PAIN: ICD-10-CM

## 2017-12-07 PROCEDURE — C1755 CATHETER, INTRASPINAL: HCPCS

## 2017-12-07 PROCEDURE — 25010000002 METHYLPREDNISOLONE PER 80 MG: Performed by: ANESTHESIOLOGY

## 2017-12-07 PROCEDURE — 0 IOPAMIDOL 41 % SOLUTION: Performed by: ANESTHESIOLOGY

## 2017-12-07 PROCEDURE — 77003 FLUOROGUIDE FOR SPINE INJECT: CPT

## 2017-12-07 RX ORDER — FENTANYL CITRATE 50 UG/ML
50 INJECTION, SOLUTION INTRAMUSCULAR; INTRAVENOUS
Status: DISCONTINUED | OUTPATIENT
Start: 2017-12-07 | End: 2017-12-08 | Stop reason: HOSPADM

## 2017-12-07 RX ORDER — SODIUM CHLORIDE 0.9 % (FLUSH) 0.9 %
1-10 SYRINGE (ML) INJECTION AS NEEDED
Status: DISCONTINUED | OUTPATIENT
Start: 2017-12-07 | End: 2017-12-08 | Stop reason: HOSPADM

## 2017-12-07 RX ORDER — MIDAZOLAM HYDROCHLORIDE 1 MG/ML
1 INJECTION INTRAMUSCULAR; INTRAVENOUS
Status: DISCONTINUED | OUTPATIENT
Start: 2017-12-07 | End: 2017-12-08 | Stop reason: HOSPADM

## 2017-12-07 RX ORDER — LIDOCAINE HYDROCHLORIDE 10 MG/ML
1 INJECTION, SOLUTION INFILTRATION; PERINEURAL ONCE
Status: DISCONTINUED | OUTPATIENT
Start: 2017-12-07 | End: 2017-12-08 | Stop reason: HOSPADM

## 2017-12-07 RX ORDER — METHYLPREDNISOLONE ACETATE 80 MG/ML
80 INJECTION, SUSPENSION INTRA-ARTICULAR; INTRALESIONAL; INTRAMUSCULAR; SOFT TISSUE ONCE
Status: COMPLETED | OUTPATIENT
Start: 2017-12-07 | End: 2017-12-07

## 2017-12-07 RX ADMIN — IOPAMIDOL 10 ML: 408 INJECTION, SOLUTION INTRATHECAL at 09:01

## 2017-12-07 RX ADMIN — METHYLPREDNISOLONE ACETATE 80 MG: 80 INJECTION, SUSPENSION INTRA-ARTICULAR; INTRALESIONAL; INTRAMUSCULAR; SOFT TISSUE at 09:01

## 2017-12-07 NOTE — ANESTHESIA PROCEDURE NOTES
PAIN Epidural block    Patient location during procedure: pain clinic  Indication:procedure for pain  Performed By  Anesthesiologist: LAZARO MCCULLOUGH  Preanesthetic Checklist  Completed: patient identified, site marked, surgical consent, pre-op evaluation, timeout performed, risks and benefits discussed and monitors and equipment checked  Additional Notes  Depomedrol - 80mg    Needle position confirmed by fluoroscopy and epidurogram using 2cc of [id number].    Diagnosis  Post-Op Diagnosis Codes:     * Lumbar spinal stenosis (M48.061)     * Lumbar degenerative disc disease (M51.36)     * Lumbar radiculopathy (M54.16)    Prep:  Pt Position:prone  Sterile Tech:cap, gloves, mask and sterile barrier  Prep:chlorhexidine gluconate and isopropyl alcohol  Monitoring:blood pressure monitoring, continuous pulse oximetry and EKG  Procedure:  Sedation: no   Approach:midline  Guidance: fluoroscopy  Location:lumbar  Level:4-5  Needle Type:Tuohy  Needle Gauge:20  Aspiration:negative  Medications:  Depomedrol:80 mg  Preservative Free Saline:2mL  Isovue:2mL    Post Assessment:  Post-procedure: bandaide.  Pt Tolerance:patient tolerated the procedure well with no apparent complications  Complications:no

## 2017-12-07 NOTE — H&P
[facility]    History and Physical    Patient Name: Duane Hemphill  :  1955  MRN:  [id number]  Date of Admission: 2017    Subjective     Patient is a 62 y.o. male presents with chief complaint of chronic, intermitent, moderate low back, hips: bilateral and leg: bilateral pain.  Onset of symptoms was gradual starting several months ago.  Symptoms are associated/aggravated by activity, exercise, lifting or twisting. Symptoms improve with rest    The following portions of the patients history were reviewed and updated as appropriate: current medications, allergies, past medical history, past surgical history, past family history, past social history and problem list                Objective     Past Medical History:   Past Medical History:   Diagnosis Date   • Fracture, femur    • Low back pain    • Nerve compression    • Neuromuscular disorder    • Sciatic leg pain      Past Surgical History:   Past Surgical History:   Procedure Laterality Date   • APPENDECTOMY     • FEMUR FRACTURE SURGERY     • GALLBLADDER SURGERY     • LUMBAR LAMINECTOMY DISCECTOMY DECOMPRESSION Right 10/4/2016    Procedure: RIGHT L4-5 LUMBAR DECOMPRESSION ;  Surgeon: Saurav Rasheed MD;  Location: [facility];  Service:      Family History:   Family History   Problem Relation Age of Onset   • Cancer Father      Social History:   Social History   Substance Use Topics   • Smoking status: Current Some Day Smoker     Years: 5.00     Types: Cigars   • Smokeless tobacco: Never Used      Comment: smokes one cigar a day   • Alcohol use No      Comment: none since 2016. was drinking 6 pack per day       Vital Signs Range for the last 24 hours  Temperature:     Temp Source:     BP:     Pulse:     Respirations:     SPO2:     O2 Amount (l/min):     O2 Devices     Weight:           --------------------------------------------------------------------------------    Current Outpatient Prescriptions   Medication Sig Dispense Refill    • gabapentin (NEURONTIN) 600 MG tablet Take 1 tablet by mouth 2 (Two) Times a Day. 180 tablet 1   • Multiple Vitamins-Minerals (CENTRUM SILVER ADULT 50+) tablet Take 1 tablet by mouth daily.       No current facility-administered medications for this encounter.        --------------------------------------------------------------------------------  Assessment/Plan      Anesthesia Evaluation     Patient summary reviewed and Nursing notes reviewed   no history of anesthetic complications:  NPO Solid Status: > 6 hours  NPO Liquid Status: > 2 hours     Airway   Mallampati: II  TM distance: >3 FB  Neck ROM: full  Dental - normal exam     Pulmonary - negative pulmonary ROS and normal exam   Cardiovascular - normal exam    (+) hypertension,       Neuro/Psych- neuro exam normal  (+) numbness,    GI/Hepatic/Renal/Endo - negative ROS     Musculoskeletal     (+) back pain, Straight Leg Test, radiculopathy  (-)  SHANTHI test  Abdominal  - normal exam   Substance History      OB/GYN          Other   (+) arthritis                                Diagnosis and Plan    Treatment Plan  ASA 2   Patient has had previous injection/procedure with 25-50% improvement.   Procedures: Lumbar Epidural Steroid Injection(LESI), With fluoroscopy,       Anesthetic plan and risks discussed with patient.          Diagnosis     * Lumbar spinal stenosis [M48.061]     * Lumbar degenerative disc disease [M51.36]     * Lumbar radiculopathy [M54.16]

## 2017-12-08 ENCOUNTER — OFFICE VISIT (OUTPATIENT)
Dept: NEUROSURGERY | Facility: CLINIC | Age: 62
End: 2017-12-08

## 2017-12-08 VITALS
DIASTOLIC BLOOD PRESSURE: 95 MMHG | HEIGHT: 69 IN | BODY MASS INDEX: 25.03 KG/M2 | HEART RATE: 55 BPM | WEIGHT: 169 LBS | SYSTOLIC BLOOD PRESSURE: 146 MMHG

## 2017-12-08 DIAGNOSIS — M48.062 SPINAL STENOSIS OF LUMBAR REGION WITH NEUROGENIC CLAUDICATION: ICD-10-CM

## 2017-12-08 DIAGNOSIS — M51.37 DEGENERATION OF LUMBAR OR LUMBOSACRAL INTERVERTEBRAL DISC: Primary | ICD-10-CM

## 2017-12-08 PROCEDURE — 99213 OFFICE O/P EST LOW 20 MIN: CPT | Performed by: NEUROLOGICAL SURGERY

## 2017-12-08 NOTE — PROGRESS NOTES
Subjective   Patient ID: Duane Hemphill is a 62 y.o. male is here today for follow-up on back and right hip pain.    At the patient's last visit he reported mild relief after having an epidural and no relief from physical therapy.    Today the patient reports that he has had his 3rd epidural and he states that he has seen some improvement in his pain.    Back Pain   This is a chronic problem. The current episode started more than 1 month ago. The problem occurs daily. The problem has been gradually improving since onset. The pain is present in the lumbar spine. Radiates to: right hip. Pertinent negatives include no bladder incontinence, bowel incontinence, numbness or tingling.       The following portions of the patient's history were reviewed and updated as appropriate: allergies, current medications, past family history, past medical history, past social history, past surgical history and problem list.    Review of Systems   Gastrointestinal: Negative for bowel incontinence.   Genitourinary: Negative for bladder incontinence.   Musculoskeletal: Positive for back pain.   Neurological: Negative for tingling and numbness.   All other systems reviewed and are negative.    We are dealing with some recurrent right buttock and posterior thigh pain. He got his 3rd epidural block just yesterday and it was the best one of the 3. He seems to be feeling better today. He is taking gabapentin at 600 mg b.i.d. The question is how long it is going to last. Dr. Lord did the block. He is still on the gabapentin at 600 mg b.i.d. I told him to give it some time and we will see him in 3 months. He will continue exercising caution at work. He got the weightlifting belt which seems to help a bit. If there is a bad recurrence or worsening he will call us and we will need to consider if we need to move forward with myelography.       Objective   Physical Exam   Constitutional: He is oriented to person, place, and time. He appears  well-developed and well-nourished.   HENT:   Head: Normocephalic and atraumatic.   Eyes: Conjunctivae and EOM are normal. Pupils are equal, round, and reactive to light.   Fundoscopic exam:       The right eye shows no papilledema. The right eye shows venous pulsations.        The left eye shows no papilledema. The left eye shows venous pulsations.   Neck: Carotid bruit is not present.   Neurological: He is oriented to person, place, and time. He has a normal Finger-Nose-Finger Test and a normal Heel to Shin Test. Gait normal.   Reflex Scores:       Tricep reflexes are 2+ on the right side and 2+ on the left side.       Bicep reflexes are 2+ on the right side and 2+ on the left side.       Brachioradialis reflexes are 2+ on the right side and 2+ on the left side.       Patellar reflexes are 2+ on the right side and 2+ on the left side.       Achilles reflexes are 2+ on the right side and 2+ on the left side.  Psychiatric: His speech is normal.     Neurologic Exam     Mental Status   Oriented to person, place, and time.   Registration of memory: Good recent and remote memory.   Attention: normal. Concentration: normal.   Speech: speech is normal   Level of consciousness: alert  Knowledge: consistent with education.     Cranial Nerves     CN II   Visual fields full to confrontation.   Visual acuity: normal    CN III, IV, VI   Pupils are equal, round, and reactive to light.  Extraocular motions are normal.     CN V   Facial sensation intact.   Right corneal reflex: normal  Left corneal reflex: normal    CN VII   Facial expression full, symmetric.   Right facial weakness: none  Left facial weakness: none    CN VIII   Hearing: intact    CN IX, X   Palate: symmetric    CN XI   Right sternocleidomastoid strength: normal  Left sternocleidomastoid strength: normal    CN XII   Tongue: not atrophic  Tongue deviation: none    Motor Exam   Muscle bulk: normal  Right arm tone: normal  Left arm tone: normal  Right leg tone:  normal  Left leg tone: normal    Strength   Strength 5/5 except as noted.     Sensory Exam   Light touch normal.     Gait, Coordination, and Reflexes     Gait  Gait: normal    Coordination   Finger to nose coordination: normal  Heel to shin coordination: normal    Reflexes   Right brachioradialis: 2+  Left brachioradialis: 2+  Right biceps: 2+  Left biceps: 2+  Right triceps: 2+  Left triceps: 2+  Right patellar: 2+  Left patellar: 2+  Right achilles: 2+  Left achilles: 2+  Right : 2+  Left : 2+      Assessment/Plan   Independent Review of Radiographic Studies:    The postoperative MRI that I reviewed last time showed postoperative changes on the right L4-L5 and perhaps some residual stenosis, but it is hard to tell. No obvious instability on the x-rays. Agree with the report.       Medical Decision Making:    He has made some progress, although the block was only done yesterday. Will continue the gabapentin at 600 mg b.i.d. He will continue using help at work and see me in 3 months. If he gets worse, he will let me know. Will need to consider if we need to move forward with myelography at that point.       Duane was seen today for back pain.    Diagnoses and all orders for this visit:    Degeneration of lumbar or lumbosacral intervertebral disc    Spinal stenosis of lumbar region with neurogenic claudication    Return in about 3 months (around 3/8/2018).

## 2018-03-09 ENCOUNTER — OFFICE VISIT (OUTPATIENT)
Dept: NEUROSURGERY | Facility: CLINIC | Age: 63
End: 2018-03-09

## 2018-03-09 VITALS
WEIGHT: 169 LBS | SYSTOLIC BLOOD PRESSURE: 133 MMHG | BODY MASS INDEX: 25.03 KG/M2 | HEART RATE: 51 BPM | HEIGHT: 69 IN | DIASTOLIC BLOOD PRESSURE: 88 MMHG

## 2018-03-09 DIAGNOSIS — M25.551 CHRONIC RIGHT HIP PAIN: ICD-10-CM

## 2018-03-09 DIAGNOSIS — M48.062 SPINAL STENOSIS OF LUMBAR REGION WITH NEUROGENIC CLAUDICATION: Primary | ICD-10-CM

## 2018-03-09 DIAGNOSIS — M51.37 DEGENERATION OF LUMBAR OR LUMBOSACRAL INTERVERTEBRAL DISC: ICD-10-CM

## 2018-03-09 DIAGNOSIS — G89.29 CHRONIC RIGHT HIP PAIN: ICD-10-CM

## 2018-03-09 PROCEDURE — 99213 OFFICE O/P EST LOW 20 MIN: CPT | Performed by: NEUROLOGICAL SURGERY

## 2018-03-09 NOTE — PROGRESS NOTES
Subjective   Patient ID: Duane Hemphill is a 62 y.o. male is here today for follow-up on back pain.    At the patient's last visit he reported getting some relief from his pain after having three epidurals.     Today the patient reports that he is still having some back pain. He reports that the last injection is starting to wear off. He is currently taking Gabapentin 600 mg BID.      Back Pain   This is a chronic problem. The current episode started more than 1 month ago. The problem occurs daily. The problem has been gradually worsening since onset. The pain is present in the lumbar spine. Pertinent negatives include no bladder incontinence, bowel incontinence, numbness, tingling or weakness.       The following portions of the patient's history were reviewed and updated as appropriate: allergies, current medications, past family history, past medical history, past social history, past surgical history and problem list.    Review of Systems   Gastrointestinal: Negative for bowel incontinence.   Genitourinary: Negative for bladder incontinence.   Musculoskeletal: Positive for back pain.   Neurological: Negative for tingling, weakness and numbness.   All other systems reviewed and are negative.    The patient is with his wife. It has been about 3 months since I have seen him. He had some recurrent right buttock and hip pain, from what I think is probably some residual stenosis at the right L4-L5 level. His last block done in 12/2017 by Dr. Lord did him the most good although it was painful at the time. He remains on gabapentin at 600 mg b.i.d. He does not notice much difference from the gabapentin but I suspect that if we took him off of it or weaned it he would feel it more, so I encouraged him to stay on it. I offered him to do another block right now by Dr. Lord and he declined. He will think about it and let me know. Otherwise, I will still need to see him because he is on gabapentin and will have him come  back in 6 months.       Objective   Physical Exam   Constitutional: He is oriented to person, place, and time. He appears well-developed and well-nourished.   HENT:   Head: Normocephalic and atraumatic.   Eyes: Conjunctivae and EOM are normal. Pupils are equal, round, and reactive to light.   Fundoscopic exam:       The right eye shows no papilledema. The right eye shows venous pulsations.        The left eye shows no papilledema. The left eye shows venous pulsations.   Neck: Carotid bruit is not present.   Neurological: He is oriented to person, place, and time. He has a normal Finger-Nose-Finger Test and a normal Heel to Shin Test. Gait normal.   Reflex Scores:       Tricep reflexes are 2+ on the right side and 2+ on the left side.       Bicep reflexes are 2+ on the right side and 2+ on the left side.       Brachioradialis reflexes are 2+ on the right side and 2+ on the left side.       Patellar reflexes are 2+ on the right side and 2+ on the left side.       Achilles reflexes are 2+ on the right side and 2+ on the left side.  Psychiatric: His speech is normal.     Neurologic Exam     Mental Status   Oriented to person, place, and time.   Registration of memory: Good recent and remote memory.   Attention: normal. Concentration: normal.   Speech: speech is normal   Level of consciousness: alert  Knowledge: consistent with education.     Cranial Nerves     CN II   Visual fields full to confrontation.   Visual acuity: normal    CN III, IV, VI   Pupils are equal, round, and reactive to light.  Extraocular motions are normal.     CN V   Facial sensation intact.   Right corneal reflex: normal  Left corneal reflex: normal    CN VII   Facial expression full, symmetric.   Right facial weakness: none  Left facial weakness: none    CN VIII   Hearing: intact    CN IX, X   Palate: symmetric    CN XI   Right sternocleidomastoid strength: normal  Left sternocleidomastoid strength: normal    CN XII   Tongue: not atrophic  Tongue  deviation: none    Motor Exam   Muscle bulk: normal  Right arm tone: normal  Left arm tone: normal  Right leg tone: normal  Left leg tone: normal    Strength   Strength 5/5 except as noted.     Sensory Exam   Light touch normal.     Gait, Coordination, and Reflexes     Gait  Gait: normal    Coordination   Finger to nose coordination: normal  Heel to shin coordination: normal    Reflexes   Right brachioradialis: 2+  Left brachioradialis: 2+  Right biceps: 2+  Left biceps: 2+  Right triceps: 2+  Left triceps: 2+  Right patellar: 2+  Left patellar: 2+  Right achilles: 2+  Left achilles: 2+  Right : 2+  Left : 2+      Assessment/Plan   Independent Review of Radiographic Studies:    His postoperative MRI was reviewed again and that showed postoperative changes at the right L4-L5 level and perhaps some residual stenosis. No obvious instability. Agree with the report.       Medical Decision Making:    We will keep things the same for right now. He will continue his gabapentin at 600 mg b.i.d. He will let us know if he would like to have a block by Dr. Lord and if so we can certainly set it up anytime. Otherwise I will see him in 6 months.       Duane was seen today for back pain.    Diagnoses and all orders for this visit:    Spinal stenosis of lumbar region with neurogenic claudication    Degeneration of lumbar or lumbosacral intervertebral disc    Chronic right hip pain    Return in about 6 months (around 9/9/2018).

## 2018-05-08 DIAGNOSIS — M25.551 RIGHT HIP PAIN: ICD-10-CM

## 2018-05-08 DIAGNOSIS — M51.37 DEGENERATION OF LUMBAR OR LUMBOSACRAL INTERVERTEBRAL DISC: ICD-10-CM

## 2018-05-08 DIAGNOSIS — M48.062 SPINAL STENOSIS, LUMBAR REGION, WITH NEUROGENIC CLAUDICATION: Primary | ICD-10-CM

## 2018-05-08 RX ORDER — GABAPENTIN 600 MG/1
600 TABLET ORAL 2 TIMES DAILY
Qty: 180 TABLET | Refills: 1 | OUTPATIENT
Start: 2018-05-08 | End: 2019-07-08

## 2018-05-08 NOTE — TELEPHONE ENCOUNTER
Refill has been sent to the [facility] pharmacy and the patient has been notified. Orders for new epidurals in the chart.

## 2018-05-08 NOTE — TELEPHONE ENCOUNTER
Go ahead and refill. Go ahead and send to [facility], [place] pain clinic for another series of lumbar SALUD's. He prefers to have it done by Dr. Lord

## 2018-05-08 NOTE — TELEPHONE ENCOUNTER
Patients wife called and the patient is needing a refill on his Gabapentin 600 mg 1 po BID. He was given #180 with 1 refill however since he does not take it BID all the time they were not able to get his 2nd refill and it has . He has a fup appointment on 2018 with Dr. Rasheed. The patient would also like another SALUD. He has had 3 but he might need a new order for the next one.

## 2018-05-25 ENCOUNTER — HOSPITAL ENCOUNTER (OUTPATIENT)
Dept: PAIN MEDICINE | Facility: HOSPITAL | Age: 63
Discharge: HOME | End: 2018-05-25
Attending: NEUROLOGICAL SURGERY | Admitting: NEUROLOGICAL SURGERY

## 2018-05-25 ENCOUNTER — HOSPITAL ENCOUNTER (OUTPATIENT)
Dept: GENERAL RADIOLOGY | Facility: HOSPITAL | Age: 63
Discharge: HOME | End: 2018-05-25

## 2018-05-25 ENCOUNTER — ANESTHESIA (OUTPATIENT)
Dept: PAIN MEDICINE | Facility: HOSPITAL | Age: 63
End: 2018-05-25

## 2018-05-25 ENCOUNTER — ANESTHESIA EVENT (OUTPATIENT)
Dept: PAIN MEDICINE | Facility: HOSPITAL | Age: 63
End: 2018-05-25

## 2018-05-25 VITALS
OXYGEN SATURATION: 96 % | TEMPERATURE: 97.5 F | HEART RATE: 48 BPM | SYSTOLIC BLOOD PRESSURE: 143 MMHG | DIASTOLIC BLOOD PRESSURE: 93 MMHG | RESPIRATION RATE: 16 BRPM

## 2018-05-25 DIAGNOSIS — M48.062 SPINAL STENOSIS, LUMBAR REGION, WITH NEUROGENIC CLAUDICATION: ICD-10-CM

## 2018-05-25 DIAGNOSIS — M25.551 RIGHT HIP PAIN: ICD-10-CM

## 2018-05-25 DIAGNOSIS — M51.37 DEGENERATION OF LUMBAR OR LUMBOSACRAL INTERVERTEBRAL DISC: ICD-10-CM

## 2018-05-25 DIAGNOSIS — R52 PAIN: ICD-10-CM

## 2018-05-25 PROCEDURE — 0 IOPAMIDOL 41 % SOLUTION: Performed by: ANESTHESIOLOGY

## 2018-05-25 PROCEDURE — 77003 FLUOROGUIDE FOR SPINE INJECT: CPT

## 2018-05-25 PROCEDURE — C1755 CATHETER, INTRASPINAL: HCPCS

## 2018-05-25 PROCEDURE — 25010000002 METHYLPREDNISOLONE PER 80 MG: Performed by: ANESTHESIOLOGY

## 2018-05-25 RX ORDER — LIDOCAINE HYDROCHLORIDE 10 MG/ML
1 INJECTION, SOLUTION INFILTRATION; PERINEURAL ONCE
Status: DISCONTINUED | OUTPATIENT
Start: 2018-05-25 | End: 2018-05-26 | Stop reason: HOSPADM

## 2018-05-25 RX ORDER — MIDAZOLAM HYDROCHLORIDE 1 MG/ML
1 INJECTION INTRAMUSCULAR; INTRAVENOUS
Status: DISCONTINUED | OUTPATIENT
Start: 2018-05-25 | End: 2018-05-26 | Stop reason: HOSPADM

## 2018-05-25 RX ORDER — METHYLPREDNISOLONE ACETATE 80 MG/ML
80 INJECTION, SUSPENSION INTRA-ARTICULAR; INTRALESIONAL; INTRAMUSCULAR; SOFT TISSUE ONCE
Status: COMPLETED | OUTPATIENT
Start: 2018-05-25 | End: 2018-05-25

## 2018-05-25 RX ORDER — FENTANYL CITRATE 50 UG/ML
50 INJECTION, SOLUTION INTRAMUSCULAR; INTRAVENOUS AS NEEDED
Status: DISCONTINUED | OUTPATIENT
Start: 2018-05-25 | End: 2018-05-26 | Stop reason: HOSPADM

## 2018-05-25 RX ADMIN — IOPAMIDOL 3 ML: 408 INJECTION, SOLUTION INTRATHECAL at 09:37

## 2018-05-25 RX ADMIN — METHYLPREDNISOLONE ACETATE 80 MG: 80 INJECTION, SUSPENSION INTRA-ARTICULAR; INTRALESIONAL; INTRAMUSCULAR; SOFT TISSUE at 09:37

## 2018-05-25 NOTE — H&P
[facility]    History and Physical    Patient Name: Duane Hemphill  :  1955  MRN:  [id number]  Date of Admission: 2018    Subjective     Patient is a 62 y.o. male presents with chief complaint of chronic, intermitent, moderate low back, hips: bilateral and leg: bilateral pain.  Onset of symptoms was gradual starting several months ago.  Symptoms are associated/aggravated by activity, exercise, lifting or twisting. Symptoms improve with rest    The following portions of the patients history were reviewed and updated as appropriate: current medications, allergies, past medical history, past surgical history, past family history, past social history and problem list                Objective     Past Medical History:   Past Medical History:   Diagnosis Date   • Fracture, femur    • Low back pain    • Nerve compression    • Neuromuscular disorder    • Sciatic leg pain      Past Surgical History:   Past Surgical History:   Procedure Laterality Date   • APPENDECTOMY     • FEMUR FRACTURE SURGERY     • GALLBLADDER SURGERY     • LUMBAR LAMINECTOMY DISCECTOMY DECOMPRESSION Right 10/4/2016    Procedure: RIGHT L4-5 LUMBAR DECOMPRESSION ;  Surgeon: Saurav Rasheed MD;  Location: [facility];  Service:      Family History:   Family History   Problem Relation Age of Onset   • Cancer Father      Social History:   Social History   Substance Use Topics   • Smoking status: Current Some Day Smoker     Years: 5.00     Types: Cigars   • Smokeless tobacco: Never Used      Comment: smokes one cigar a day   • Alcohol use No      Comment: none since 2016. was drinking 6 pack per day       Vital Signs Range for the last 24 hours  Temperature: Temp:  [36.4 °C (97.5 °F)] 36.4 °C (97.5 °F)   Temp Source: Temp src: Oral   BP: BP: (163)/(94) 163/94   Pulse: Heart Rate:  [43] 43   Respirations: Resp:  [16] 16   SPO2: SpO2:  [99 %] 99 %   O2 Amount (l/min):     O2 Devices Device (Oxygen Therapy): room air   Weight:            --------------------------------------------------------------------------------    Current Outpatient Prescriptions   Medication Sig Dispense Refill   • gabapentin (NEURONTIN) 600 MG tablet Take 1 tablet by mouth 2 (Two) Times a Day. 180 tablet 1   • Multiple Vitamins-Minerals (CENTRUM SILVER ADULT 50+) tablet Take 1 tablet by mouth daily.       No current facility-administered medications for this encounter.        --------------------------------------------------------------------------------  Assessment/Plan      Anesthesia Evaluation     Patient summary reviewed and Nursing notes reviewed   no history of anesthetic complications:  NPO Solid Status: > 6 hours  NPO Liquid Status: > 2 hours    Pain impairs ability to perform ADLs: Ambulation  Modalities previously tried to control pain with limited effectiveness: Rest     Airway   Mallampati: II  TM distance: >3 FB  Neck ROM: full  Dental - normal exam     Pulmonary - negative pulmonary ROS and normal exam   Cardiovascular - normal exam    (+) hypertension,       Neuro/Psych- neuro exam normal  (+) numbness,     GI/Hepatic/Renal/Endo - negative ROS     Musculoskeletal     (+) back pain, radiculopathy  (-)  SHANTHI test  Abdominal  - normal exam   Substance History      OB/GYN          Other   (+) arthritis                Diagnosis and Plan      Treatment Plan  ASA 2      Procedures: Lumbar Epidural Steroid Injection(LESI), With fluoroscopy,       Anesthetic plan and risks discussed with patient.          Diagnosis     * Lumbar radiculopathy [M54.16]     * Lumbar spinal stenosis [M48.061]     * Lumbar degenerative disc disease [M51.36]

## 2018-05-25 NOTE — ANESTHESIA PROCEDURE NOTES
PAIN Epidural block    Patient location during procedure: pain clinic  Indication:procedure for pain  Performed By  Anesthesiologist: LAZARO MCCULLOUGH  Preanesthetic Checklist  Completed: patient identified, site marked, surgical consent, pre-op evaluation, timeout performed, risks and benefits discussed and monitors and equipment checked  Additional Notes  Depomedrol - 80mg    Needle position confirmed by fluoroscopy and epidurogram using 2cc of [id number].    Diagnosis  Post-Op Diagnosis Codes:     * Lumbar radiculopathy (M54.16)     * Lumbar spinal stenosis (M48.061)     * Lumbar degenerative disc disease (M51.36)    Prep:  Pt Position:prone  Sterile Tech:cap, gloves, mask and sterile barrier  Prep:chlorhexidine gluconate and isopropyl alcohol  Monitoring:blood pressure monitoring, continuous pulse oximetry and EKG  Procedure:  Sedation: no   Approach:midline  Guidance: fluoroscopy  Location:lumbar  Level:4-5  Needle Type:Tuohy  Needle Gauge:20  Aspiration:negative  Medications:  Depomedrol:80 mg  Preservative Free Saline:2mL  Isovue:2mL    Post Assessment:  Post-procedure: bandaide.  Pt Tolerance:patient tolerated the procedure well with no apparent complications  Complications:no

## 2019-07-08 ENCOUNTER — OFFICE VISIT (OUTPATIENT)
Dept: NEUROSURGERY | Facility: CLINIC | Age: 64
End: 2019-07-08

## 2019-07-08 VITALS
DIASTOLIC BLOOD PRESSURE: 100 MMHG | HEART RATE: 100 BPM | HEIGHT: 69 IN | WEIGHT: 169 LBS | BODY MASS INDEX: 25.03 KG/M2 | SYSTOLIC BLOOD PRESSURE: 171 MMHG

## 2019-07-08 DIAGNOSIS — M48.062 SPINAL STENOSIS OF LUMBAR REGION WITH NEUROGENIC CLAUDICATION: Primary | ICD-10-CM

## 2019-07-08 PROCEDURE — 99213 OFFICE O/P EST LOW 20 MIN: CPT | Performed by: NURSE PRACTITIONER

## 2019-07-08 RX ORDER — GABAPENTIN 300 MG/1
300 CAPSULE ORAL 3 TIMES DAILY
Qty: 90 CAPSULE | Refills: 2 | Status: SHIPPED | OUTPATIENT
Start: 2019-07-08 | End: 2019-09-30

## 2019-07-08 RX ORDER — METHYLPREDNISOLONE 4 MG/1
TABLET ORAL
Qty: 1 TABLET | Refills: 0 | Status: SHIPPED | OUTPATIENT
Start: 2019-07-08 | End: 2019-09-10

## 2019-07-11 ENCOUNTER — HOSPITAL ENCOUNTER (OUTPATIENT)
Dept: PAIN MEDICINE | Facility: HOSPITAL | Age: 64
Discharge: HOME | End: 2019-07-11
Admitting: ANESTHESIOLOGY

## 2019-07-11 ENCOUNTER — HOSPITAL ENCOUNTER (OUTPATIENT)
Dept: GENERAL RADIOLOGY | Facility: HOSPITAL | Age: 64
Discharge: HOME | End: 2019-07-11

## 2019-07-11 ENCOUNTER — ANESTHESIA EVENT (OUTPATIENT)
Dept: PAIN MEDICINE | Facility: HOSPITAL | Age: 64
End: 2019-07-11

## 2019-07-11 ENCOUNTER — ANESTHESIA (OUTPATIENT)
Dept: PAIN MEDICINE | Facility: HOSPITAL | Age: 64
End: 2019-07-11

## 2019-07-11 VITALS
DIASTOLIC BLOOD PRESSURE: 94 MMHG | RESPIRATION RATE: 16 BRPM | WEIGHT: 168 LBS | HEIGHT: 69 IN | HEART RATE: 57 BPM | SYSTOLIC BLOOD PRESSURE: 158 MMHG | BODY MASS INDEX: 24.88 KG/M2 | OXYGEN SATURATION: 97 % | TEMPERATURE: 97.6 F

## 2019-07-11 DIAGNOSIS — M48.061 SPINAL STENOSIS OF LUMBAR REGION WITHOUT NEUROGENIC CLAUDICATION: Primary | ICD-10-CM

## 2019-07-11 DIAGNOSIS — R52 PAIN: ICD-10-CM

## 2019-07-11 PROCEDURE — 77003 FLUOROGUIDE FOR SPINE INJECT: CPT

## 2019-07-11 PROCEDURE — 25010000002 MIDAZOLAM PER 1 MG: Performed by: ANESTHESIOLOGY

## 2019-07-11 PROCEDURE — 0 IOPAMIDOL 41 % SOLUTION: Performed by: ANESTHESIOLOGY

## 2019-07-11 PROCEDURE — 25010000002 METHYLPREDNISOLONE PER 80 MG: Performed by: ANESTHESIOLOGY

## 2019-07-11 PROCEDURE — C1755 CATHETER, INTRASPINAL: HCPCS

## 2019-07-11 RX ORDER — MIDAZOLAM HYDROCHLORIDE 1 MG/ML
1 INJECTION INTRAMUSCULAR; INTRAVENOUS AS NEEDED
Status: DISCONTINUED | OUTPATIENT
Start: 2019-07-11 | End: 2019-07-12 | Stop reason: HOSPADM

## 2019-07-11 RX ORDER — LIDOCAINE HYDROCHLORIDE 10 MG/ML
0.5 INJECTION, SOLUTION INFILTRATION; PERINEURAL ONCE AS NEEDED
Status: DISCONTINUED | OUTPATIENT
Start: 2019-07-11 | End: 2019-07-12 | Stop reason: HOSPADM

## 2019-07-11 RX ORDER — SODIUM CHLORIDE 0.9 % (FLUSH) 0.9 %
3 SYRINGE (ML) INJECTION EVERY 12 HOURS SCHEDULED
Status: DISCONTINUED | OUTPATIENT
Start: 2019-07-11 | End: 2019-07-12 | Stop reason: HOSPADM

## 2019-07-11 RX ORDER — METHYLPREDNISOLONE ACETATE 80 MG/ML
80 INJECTION, SUSPENSION INTRA-ARTICULAR; INTRALESIONAL; INTRAMUSCULAR; SOFT TISSUE ONCE
Status: COMPLETED | OUTPATIENT
Start: 2019-07-11 | End: 2019-07-11

## 2019-07-11 RX ORDER — SODIUM CHLORIDE 0.9 % (FLUSH) 0.9 %
1-10 SYRINGE (ML) INJECTION AS NEEDED
Status: DISCONTINUED | OUTPATIENT
Start: 2019-07-11 | End: 2019-07-12 | Stop reason: HOSPADM

## 2019-07-11 RX ORDER — FENTANYL CITRATE 50 UG/ML
50 INJECTION, SOLUTION INTRAMUSCULAR; INTRAVENOUS AS NEEDED
Status: DISCONTINUED | OUTPATIENT
Start: 2019-07-11 | End: 2019-07-12 | Stop reason: HOSPADM

## 2019-07-11 RX ORDER — LIDOCAINE HYDROCHLORIDE 10 MG/ML
1 INJECTION, SOLUTION INFILTRATION; PERINEURAL ONCE AS NEEDED
Status: DISCONTINUED | OUTPATIENT
Start: 2019-07-11 | End: 2019-07-12 | Stop reason: HOSPADM

## 2019-07-11 RX ORDER — SODIUM CHLORIDE 0.9 % (FLUSH) 0.9 %
3-10 SYRINGE (ML) INJECTION AS NEEDED
Status: DISCONTINUED | OUTPATIENT
Start: 2019-07-11 | End: 2019-07-12 | Stop reason: HOSPADM

## 2019-07-11 RX ADMIN — MIDAZOLAM HYDROCHLORIDE 1 MG: 1 INJECTION, SOLUTION INTRAMUSCULAR; INTRAVENOUS at 09:56

## 2019-07-11 RX ADMIN — IOPAMIDOL 10 ML: 408 INJECTION, SOLUTION INTRATHECAL at 10:02

## 2019-07-11 RX ADMIN — METHYLPREDNISOLONE ACETATE 80 MG: 80 INJECTION, SUSPENSION INTRA-ARTICULAR; INTRALESIONAL; INTRAMUSCULAR; SOFT TISSUE at 10:02

## 2019-07-11 NOTE — ANESTHESIA PROCEDURE NOTES
PAIN Epidural block      Patient reassessed immediately prior to procedure    Patient location during procedure: pain clinic  Start Time: 7/11/2019 9:54 AM  Stop Time: 7/11/2019 10:06 AM  Indication:procedure for pain  Performed By  Anesthesiologist: Edgar Bang MD  Preanesthetic Checklist  Completed: patient identified and risks and benefits discussed  Additional Notes  Diagnosis:     Post-Op Diagnosis Codes:     * Spinal stenosis of lumbar region without neurogenic claudication (M48.061)     * Lumbar degenerative disc disease (M51.36)     * Lumbar neuritis (M54.16)    Sedation: Versed 1 mg    A lumbar epidural steroid injection with fluoroscopic guidance and an Isovue dye epidurogram was performed.  Under fluoroscopic guidance, the epidural space was identified and accessed, confirmed by loss of resistance to saline followed by injection of Isovue dye, which shows a good epidurogram.  The above medications were injected uneventfully.    Prep:  Pt Position:prone  Sterile Tech:cap, gloves, mask and sterile barrier  Prep:chlorhexidine gluconate and isopropyl alcohol  Monitoring:blood pressure monitoring, continuous pulse oximetry and EKG  Procedure:Sedation: yes     Approach:right paramedian  Guidance: fluoroscopy  Location:lumbar  Level:4-5  Needle Type:Tuohy  Needle Gauge:20  Aspiration:negative  Medications:  Depomedrol:80  Preservative Free Saline:1mL  Isovue:1mL    Post Assessment:  Pt Tolerance:patient tolerated the procedure well with no apparent complications  Complications:no

## 2019-07-11 NOTE — H&P
"INTERVAL HISTORY:    Chief complaint is low back and hip pain    He complains of low lumbar back pain which radiates into his bilateral hips.  7 out of 10 on the pain scale.  Constant timing.  Stabbing throbbing aching sharp in nature.  Aggravated by all activity.  He has not found anything that does relieve it although he is tried a Medrol Dosepak Neurontin and rest.  He has had steroid injections in the past with good relief.    The patient returns for another Lumbar epidural steroid injection today.  They have received 60% improvement since their last injection with a pain level of 7/10 at its worst recently.  His previous injection was over 6 weeks ago and he received excellent relief until about a week ago when his pain came back quickly and suddenly.  He is taking Neurontin and very active at home.  His MRI shows residual spinal stenosis    Current Outpatient Medications on File Prior to Encounter   Medication Sig Dispense Refill   • gabapentin (NEURONTIN) 300 MG capsule Take 1 capsule by mouth 3 (Three) Times a Day. 90 capsule 2   • methylPREDNISolone (MEDROL, ROBERTO CARLOS,) 4 MG tablet follow package directions 1 tablet 0   • Multiple Vitamins-Minerals (CENTRUM SILVER ADULT 50+) tablet Take 1 tablet by mouth daily.       No current facility-administered medications on file prior to encounter.        Past Medical History:   Diagnosis Date   • Fracture, femur (CMS/HCC)    • Low back pain    • Nerve compression    • Neuromuscular disorder (CMS/HCC)    • Sciatic leg pain        No hematologic infectious or constitutional symptoms  Negative patient screen for TOY      Exam:  There were no vitals taken for this visit.   BP (!) 192/106 (BP Location: Left arm, Patient Position: Sitting)   Pulse (!) 49   Temp 36.4 °C (97.6 °F) (Oral)   Resp 16   Ht 175.3 cm (69\")   Wt 76.2 kg (168 lb)   SpO2 100%   BMI 24.81 kg/m²   Low back with well-healed midline scar.  Nontender to palpation  Airway Mallampatti 2  Alert and " oriented      Diagnosis:  Post-Op Diagnosis Codes:     * Spinal stenosis of lumbar region without neurogenic claudication [M48.061]     * Lumbar degenerative disc disease [M51.36]     * Lumbar neuritis [M54.16]    Plan:  Lumbar 4 epidural steroid injection under fluoroscopic guidance    I have encouraged them to continue:  1.  Physical therapy exercises at home as prescribed by physical therapy or from the pain clinic handout (given to the patient).  Continuation of these exercises every day, or multiple times per week, even when the patient has good pain relief, was stressed to the patient as a preventative measure to decrease the frequency and severity of future pain episodes.  2.  Continue pain medicines as already prescribed.  If patient not currently taking any, it is recommended to begin Acetaminophen 1000 mg po q 8 hours.  If other medicines containing Acetaminophen are currently prescribed, maintain daily dose at 3000mg.    3.  If they can tolerate NSAIDS, it is recommended to take Ibuprofen 600 mg po q 6 hours for 7 days during pain exacerbations.   Alternatively, they may substitute an NSAID of their choice (e.g. Aleve)  4.  Heat and ice to the affected area as tolerated for pain control.  It was discussed that heating pads can cause burns.  5.  Low impact exercise such as walking or water exercise was recommended to maintain overall health and aid in weight control.   6.  Follow up as needed for subsequent injections.  7.  Patient was counseled to abstain from tobacco products.

## 2019-09-03 ENCOUNTER — TELEPHONE (OUTPATIENT)
Dept: NEUROSURGERY | Facility: CLINIC | Age: 64
End: 2019-09-03

## 2019-09-03 NOTE — TELEPHONE ENCOUNTER
LM for patient letting him know Karla out of office on 9.10.19, his appt has been moved to Anali's schedule for same day at 9:45 am    New appt reminder mailed

## 2019-09-06 NOTE — PROGRESS NOTES
Subjective   Patient ID: Duane Hemphill is a 63 y.o. male is here today for follow-up for back pain after a SALUD. He states epidural helped relieve leg pain. The MDP offered only mild relief. He takes Gabapentin 300 mg TID for pain.      Back Pain   This is a chronic problem. The problem occurs constantly. The problem has been gradually worsening since onset. The pain is present in the lumbar spine. The pain radiates to the right thigh and right foot. The pain is at a severity of 7/10. The pain is moderate. Associated symptoms include leg pain and weakness. Pertinent negatives include no bladder incontinence, bowel incontinence, numbness or perianal numbness. Treatments tried: SALUD.   Leg Pain    The pain is present in the right leg. The pain is at a severity of 5/10. The pain is moderate. Pertinent negatives include no numbness. Treatments tried: MDP, SALUD        The following portions of the patient's history were reviewed and updated as appropriate: allergies, current medications, past family history, past medical history, past social history, past surgical history and problem list.    Review of Systems   Gastrointestinal: Negative for bowel incontinence.   Genitourinary: Negative for bladder incontinence and difficulty urinating.   Musculoskeletal: Positive for back pain (R leg pain ).   Neurological: Positive for weakness. Negative for numbness.   All other systems reviewed and are negative.      Objective   Physical Exam   Constitutional: He is oriented to person, place, and time. He appears well-developed and well-nourished.   HENT:   Head: Normocephalic and atraumatic.   Right Ear: External ear normal.   Left Ear: External ear normal.   Eyes: Conjunctivae and EOM are normal. Pupils are equal, round, and reactive to light. Right eye exhibits no discharge. Left eye exhibits no discharge.   Neck: Normal range of motion. Neck supple. No tracheal deviation present.   Cardiovascular: Intact distal pulses.    Pulmonary/Chest: Effort normal. No stridor. No respiratory distress.   Musculoskeletal: Normal range of motion. He exhibits no edema, tenderness or deformity.   Neurological: He is alert and oriented to person, place, and time. He has normal reflexes. He displays no atrophy, no tremor and normal reflexes. No cranial nerve deficit or sensory deficit. He exhibits normal muscle tone. He displays a negative Romberg sign. He displays no seizure activity. Coordination and gait normal.   No long tract signs      Motor intact other than  3+/5 R quad and iliopsoas weakness   Skin: Skin is warm and dry.   Psychiatric: He has a normal mood and affect. His behavior is normal. Judgment and thought content normal.   Nursing note and vitals reviewed.    Neurologic Exam     Mental Status   Oriented to person, place, and time.     Cranial Nerves     CN III, IV, VI   Pupils are equal, round, and reactive to light.  Extraocular motions are normal.       Assessment/Plan   Independent Review of Radiographic Studies:      Medical Decision Making:    Mr. Hemphill underwent a open R L4-5 microdecompression with Dr. Rasheed in 2016.  Well postoperatively but has always had some back pain and right buttock and lateral leg pain.  Initially the pain was very mild but over the last year in particular the pain has gotten progressively more severe.  The radicular symptoms are far more limiting.  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  He denies any focal weakness but his exam does actually reveal fairly significant right quadriceps and iliopsoas weakness.  He has a history of a right femur fracture but as far as the patient is aware of this weakness is new.  I reviewed past exams from our office as well and do not see that the weakness was documented.  No incontinence.  He had a series of epidurals between December in July 2019 which offered  relief but very short-lived relief.  He states that at most he gets 1 to 2 weeks of relief from each injection.    His last MRI was in 2016.  At that time he did have fairly severe foraminal narrowing at both L4-L5 and L5-S1 secondary to facet arthropathy.  I recommended that we go ahead with a new MRI and x-rays and have him follow-up thereafter to discuss potential surgical intervention with Dr. Rasheed.  He will call in the interim with questions or concerns.  Duane was seen today for back pain.    Diagnoses and all orders for this visit:    Lumbar radiculopathy  -     MRI Lumbar Spine With & Without Contrast; Future  -     XR Spine Lumbar Complete With Flex & Ext; Future    DDD (degenerative disc disease), lumbar  -     MRI Lumbar Spine With & Without Contrast; Future  -     XR Spine Lumbar Complete With Flex & Ext; Future      Return for follow up after radiology test with Dr. Rasheed to discuss surgery.

## 2019-09-10 ENCOUNTER — OFFICE VISIT (OUTPATIENT)
Dept: NEUROSURGERY | Facility: CLINIC | Age: 64
End: 2019-09-10

## 2019-09-10 VITALS
HEART RATE: 45 BPM | HEIGHT: 69 IN | DIASTOLIC BLOOD PRESSURE: 92 MMHG | BODY MASS INDEX: 24.88 KG/M2 | WEIGHT: 168 LBS | SYSTOLIC BLOOD PRESSURE: 171 MMHG

## 2019-09-10 DIAGNOSIS — M51.36 DDD (DEGENERATIVE DISC DISEASE), LUMBAR: ICD-10-CM

## 2019-09-10 DIAGNOSIS — M54.16 LUMBAR RADICULOPATHY: Primary | ICD-10-CM

## 2019-09-10 PROBLEM — M51.369 DDD (DEGENERATIVE DISC DISEASE), LUMBAR: Status: ACTIVE | Noted: 2017-04-28

## 2019-09-10 PROBLEM — M48.061 STENOSIS, SPINAL, LUMBAR: Status: RESOLVED | Noted: 2017-07-03 | Resolved: 2019-09-10

## 2019-09-10 PROCEDURE — 99214 OFFICE O/P EST MOD 30 MIN: CPT | Performed by: PHYSICIAN ASSISTANT

## 2019-09-24 NOTE — PROGRESS NOTES
Subjective   Patient ID: Duane Hemphill is a 63 y.o. male is here today for follow-up to discuss lumbar MRI and plain film results. SALUD has given very little relief.     Patient was last seen 9.10.19 for constant moderate to severe left sided low back pain and intermittent right leg and right hip pain and right leg weakness.  Patient states that his symptoms are unchanged.      He is taking Gabapentin 100 mg TID, our chart reflected 300 mg TID, however, he insist that he is taking 100 mg and not 300 mg    Patient had surgery in 2016 , open right L4/5 microdecompression    I did an open right L4-L5 diskectomy about 3 years ago for severe radiculopathy. It helped him quite a bit although it took a while to recover but over the last 7 or so months he has developed recurrent right leg pain and more back pain. Blocks once helped him but not much any longer. He is on gabapentin. I reviewed his MRI and plain x-rays. He has developed a degenerative spondylolisthesis and more spinal stenosis at L4-L5. It is actually worse on the left compared to the right. He does not really have much left-sided radiculopathy but he does have quite a bit of back pain on both sides. He is also subjectively weak and he cannot bear much weight. I cannot really detect any objective neurological deficits but I think part of that is the severe pain that he is having. If we were to do anything about this, he would need to be decompressed and fused at L4-L5. It is a bigger operation but I think he is likely to benefit. If we do not do this then he is likely going to continue to have pain and may develop more weakness over time. He is generally healthy and really would not need cardiac clearance. He does chew tobacco and I counseled him that he would have to stop that at least for the first 3-6 months after surgery. He would like to think about it and talk to his family and wife and let me know what he would like to do.      Back Pain   The problem  occurs constantly. The problem is unchanged. The pain is present in the lumbar spine. The quality of the pain is described as shooting. The pain radiates to the right thigh. The pain is at a severity of 4/10. The pain is moderate. The symptoms are aggravated by bending, position, standing, sitting and twisting. Associated symptoms include leg pain and weakness. Pertinent negatives include no numbness.   Leg Pain    The pain is present in the right hip and right thigh. The pain is at a severity of 4/10. The pain is moderate. The pain has been intermittent since onset. Pertinent negatives include no numbness.   Extremity Weakness    The pain is present in the right upper leg and right lower leg. The pain is at a severity of 4/10. The pain is moderate. Pertinent negatives include no numbness.       The following portions of the patient's history were reviewed and updated as appropriate: allergies, current medications, past family history, past medical history, past social history, past surgical history and problem list.    Review of Systems   Musculoskeletal: Positive for back pain and extremity weakness. Negative for arthralgias, gait problem and myalgias.   Neurological: Positive for weakness. Negative for numbness.   All other systems reviewed and are negative.      Objective   Physical Exam   Constitutional: He is oriented to person, place, and time. He appears well-developed and well-nourished.   HENT:   Head: Normocephalic and atraumatic.   Eyes: Conjunctivae and EOM are normal. Pupils are equal, round, and reactive to light.   Fundoscopic exam:       The right eye shows no papilledema. The right eye shows venous pulsations.        The left eye shows no papilledema. The left eye shows venous pulsations.   Neck: Carotid bruit is not present.   Neurological: He is oriented to person, place, and time. He has a normal Finger-Nose-Finger Test and a normal Heel to Shin Test. Gait normal.   Reflex Scores:       Tricep  reflexes are 2+ on the right side and 2+ on the left side.       Bicep reflexes are 2+ on the right side and 2+ on the left side.       Brachioradialis reflexes are 2+ on the right side and 2+ on the left side.       Patellar reflexes are 2+ on the right side and 2+ on the left side.       Achilles reflexes are 2+ on the right side and 2+ on the left side.  Psychiatric: His speech is normal.     Neurologic Exam     Mental Status   Oriented to person, place, and time.   Registration of memory: Good recent and remote memory.   Attention: normal. Concentration: normal.   Speech: speech is normal   Level of consciousness: alert  Knowledge: consistent with education.     Cranial Nerves     CN II   Visual fields full to confrontation.   Visual acuity: normal    CN III, IV, VI   Pupils are equal, round, and reactive to light.  Extraocular motions are normal.     CN V   Facial sensation intact.   Right corneal reflex: normal  Left corneal reflex: normal    CN VII   Facial expression full, symmetric.   Right facial weakness: none  Left facial weakness: none    CN VIII   Hearing: intact    CN IX, X   Palate: symmetric    CN XI   Right sternocleidomastoid strength: normal  Left sternocleidomastoid strength: normal    CN XII   Tongue: not atrophic  Tongue deviation: none    Motor Exam   Muscle bulk: normal  Right arm tone: normal  Left arm tone: normal  Right leg tone: normal  Left leg tone: normal    Strength   Strength 5/5 except as noted.     Sensory Exam   Light touch normal.     Gait, Coordination, and Reflexes     Gait  Gait: normal    Coordination   Finger to nose coordination: normal  Heel to shin coordination: normal    Reflexes   Right brachioradialis: 2+  Left brachioradialis: 2+  Right biceps: 2+  Left biceps: 2+  Right triceps: 2+  Left triceps: 2+  Right patellar: 2+  Left patellar: 2+  Right achilles: 2+  Left achilles: 2+  Right : 2+  Left : 2+      Assessment/Plan   Independent Review of Radiographic  Studies:    Viewed the lumbar MRI done 9/30/2019 as well as the plain x-rays with show a degenerative spondylolisthesis and some spinal stenosis at L4-L5, actually worse on the left, right but with root compression.  Agree with the report.    Medical Decision Making:    I described and recommended a lumbar decompression and fusion with posterior lumbar interbody fusion (PLIF) and Sextant pedicle screw fixation at L4/5. The goal of surgery is relief of radiating leg pain, improvement of numbness, tingling, and weakness, and reduction in overall low back pain. The risks include, but are not limited to, infection, hemorrhage requiring transfusion or reoperation, CSF leak requiring reoperation, incomplete relief of symptoms, psuedoarthrosis resulting in chronic low back pain, hardward problems requiring revision or removal, potential need for additional surgery in the future, stroke, paralysis, coma, and death. The patient would like to think about it and will let us know if he would like to proceed.      Duane was seen today for back pain, leg pain, hip pain and extremity weakness.    Diagnoses and all orders for this visit:    Spinal stenosis of lumbar region with neurogenic claudication    Spondylolisthesis at L4-L5 level    Right leg weakness      Return for He will call regarding surgery.

## 2019-09-30 ENCOUNTER — HOSPITAL ENCOUNTER (OUTPATIENT)
Dept: MRI IMAGING | Facility: HOSPITAL | Age: 64
Discharge: HOME | End: 2019-09-30
Admitting: PHYSICIAN ASSISTANT

## 2019-09-30 ENCOUNTER — HOSPITAL ENCOUNTER (OUTPATIENT)
Dept: GENERAL RADIOLOGY | Facility: HOSPITAL | Age: 64
Discharge: HOME | End: 2019-09-30

## 2019-09-30 ENCOUNTER — OFFICE VISIT (OUTPATIENT)
Dept: NEUROSURGERY | Facility: CLINIC | Age: 64
End: 2019-09-30

## 2019-09-30 VITALS
HEIGHT: 68 IN | BODY MASS INDEX: 24.33 KG/M2 | DIASTOLIC BLOOD PRESSURE: 91 MMHG | HEART RATE: 74 BPM | SYSTOLIC BLOOD PRESSURE: 176 MMHG

## 2019-09-30 DIAGNOSIS — M48.062 SPINAL STENOSIS OF LUMBAR REGION WITH NEUROGENIC CLAUDICATION: Primary | ICD-10-CM

## 2019-09-30 DIAGNOSIS — M43.16 SPONDYLOLISTHESIS AT L4-L5 LEVEL: ICD-10-CM

## 2019-09-30 DIAGNOSIS — M54.16 LUMBAR RADICULOPATHY: ICD-10-CM

## 2019-09-30 DIAGNOSIS — R29.898 RIGHT LEG WEAKNESS: ICD-10-CM

## 2019-09-30 DIAGNOSIS — M51.36 DDD (DEGENERATIVE DISC DISEASE), LUMBAR: ICD-10-CM

## 2019-09-30 LAB — CREAT BLDA-MCNC: 1.1 MG/DL (ref 0.6–1.3)

## 2019-09-30 PROCEDURE — 72114 X-RAY EXAM L-S SPINE BENDING: CPT

## 2019-09-30 PROCEDURE — A9577 INJ MULTIHANCE: HCPCS | Performed by: PHYSICIAN ASSISTANT

## 2019-09-30 PROCEDURE — 0 GADOBENATE DIMEGLUMINE 529 MG/ML SOLUTION: Performed by: PHYSICIAN ASSISTANT

## 2019-09-30 PROCEDURE — 72158 MRI LUMBAR SPINE W/O & W/DYE: CPT

## 2019-09-30 PROCEDURE — 99214 OFFICE O/P EST MOD 30 MIN: CPT | Performed by: NEUROLOGICAL SURGERY

## 2019-09-30 PROCEDURE — 82565 ASSAY OF CREATININE: CPT

## 2019-09-30 RX ORDER — GABAPENTIN 100 MG/1
100 CAPSULE ORAL 3 TIMES DAILY
COMMUNITY
End: 2023-03-15

## 2019-09-30 RX ADMIN — GADOBENATE DIMEGLUMINE 15 ML: 529 INJECTION, SOLUTION INTRAVENOUS at 11:01

## 2019-12-03 ENCOUNTER — PREP FOR SURGERY (OUTPATIENT)
Dept: OTHER | Facility: HOSPITAL | Age: 64
End: 2019-12-03

## 2019-12-03 DIAGNOSIS — G89.29 CHRONIC RIGHT HIP PAIN: ICD-10-CM

## 2019-12-03 DIAGNOSIS — Z01.818 PRE-OP TESTING: ICD-10-CM

## 2019-12-03 DIAGNOSIS — M25.551 CHRONIC RIGHT HIP PAIN: ICD-10-CM

## 2019-12-03 DIAGNOSIS — M51.36 DDD (DEGENERATIVE DISC DISEASE), LUMBAR: ICD-10-CM

## 2019-12-03 DIAGNOSIS — M54.16 LUMBAR RADICULOPATHY: Primary | ICD-10-CM

## 2019-12-12 NOTE — PROGRESS NOTES
Subjective   Patient ID: Duane Hemphill is a 64 y.o. male is here today for follow-up.  Patient went for PAT's today and his BP was 180/102, his BP is 174/ 91 now in the office     Patient is scheduled for surgery 12.17.19, L4/5 open decompression and posterior interbody fusion with cage/sextant pedicle screw fixation    Patient was last seen 9.30.19 for constant low back, right leg and hip pain and right leg weakness.  Patient states that his symptoms are unchanged.     He is taking Gabapentin 100 mg TID    He is scheduled to have surgery tomorrow consisting of an L4-L5 decompression and fusion.  I had to see him prior to the surgery because I had not seen him in almost 3 months.  He continues to have low back pain and right buttock and leg pain, but his symptoms have progressed to now involve the left buttock and leg and worsening of his back pain.  He feels very unsteady and is having more pain when he stands and walks.  He does not have any outright motor deficits or cauda equina syndrome, but his pathology is as described below.  We will move forward with his L4-L5 decompression and fusion with pedicle screw fixation tomorrow.    He has been describing some increase in his blood pressure probably from pain.  But we will have the hospitalist manage his blood pressure as well.  Frankly I think that once his pain is under better control his blood pressure will come down accordingly.      Back Pain   The problem occurs constantly. The problem is unchanged. The pain is present in the lumbar spine. The pain radiates to the right thigh. The pain is at a severity of 4/10. The pain is moderate. Associated symptoms include leg pain and weakness. Pertinent negatives include no numbness.   Leg Pain    The pain is present in the right leg and right hip. The pain is at a severity of 4/10. The pain is moderate. The pain has been constant since onset. Pertinent negatives include no numbness.   Extremity Weakness    The pain is  present in the right upper leg and right lower leg. The problem occurs constantly. The problem has been unchanged. The pain is at a severity of 5/10. The pain is moderate. Pertinent negatives include no numbness.       The following portions of the patient's history were reviewed and updated as appropriate: allergies, current medications, past family history, past medical history, past social history, past surgical history and problem list.    Review of Systems   Musculoskeletal: Positive for back pain and extremity weakness. Negative for arthralgias, gait problem and myalgias.   Neurological: Positive for weakness. Negative for numbness.   All other systems reviewed and are negative.      Objective   Physical Exam   Constitutional: He is oriented to person, place, and time. He appears well-developed and well-nourished.   HENT:   Head: Normocephalic and atraumatic.   Eyes: Pupils are equal, round, and reactive to light. Conjunctivae and EOM are normal.   Fundoscopic exam:       The right eye shows no papilledema. The right eye shows venous pulsations.        The left eye shows no papilledema. The left eye shows venous pulsations.   Neck: Carotid bruit is not present.   Neurological: He is oriented to person, place, and time. He has a normal Finger-Nose-Finger Test and a normal Heel to Shin Test. Gait normal.   Reflex Scores:       Tricep reflexes are 2+ on the right side and 2+ on the left side.       Bicep reflexes are 2+ on the right side and 2+ on the left side.       Brachioradialis reflexes are 2+ on the right side and 2+ on the left side.       Patellar reflexes are 2+ on the right side and 2+ on the left side.       Achilles reflexes are 2+ on the right side and 2+ on the left side.  Psychiatric: His speech is normal.     Neurologic Exam     Mental Status   Oriented to person, place, and time.   Registration of memory: Good recent and remote memory.   Attention: normal. Concentration: normal.   Speech: speech  is normal   Level of consciousness: alert  Knowledge: consistent with education.     Cranial Nerves     CN II   Visual fields full to confrontation.   Visual acuity: normal    CN III, IV, VI   Pupils are equal, round, and reactive to light.  Extraocular motions are normal.     CN V   Facial sensation intact.   Right corneal reflex: normal  Left corneal reflex: normal    CN VII   Facial expression full, symmetric.   Right facial weakness: none  Left facial weakness: none    CN VIII   Hearing: intact    CN IX, X   Palate: symmetric    CN XI   Right sternocleidomastoid strength: normal  Left sternocleidomastoid strength: normal    CN XII   Tongue: not atrophic  Tongue deviation: none    Motor Exam   Muscle bulk: normal  Right arm tone: normal  Left arm tone: normal  Right leg tone: normal  Left leg tone: normal    Strength   Strength 5/5 except as noted.     Sensory Exam   Light touch normal.     Gait, Coordination, and Reflexes     Gait  Gait: normal    Coordination   Finger to nose coordination: normal  Heel to shin coordination: normal    Reflexes   Right brachioradialis: 2+  Left brachioradialis: 2+  Right biceps: 2+  Left biceps: 2+  Right triceps: 2+  Left triceps: 2+  Right patellar: 2+  Left patellar: 2+  Right achilles: 2+  Left achilles: 2+  Right : 2+  Left : 2+      Assessment/Plan   Independent Review of Radiographic Studies:    Viewed the lumbar MRI done 9/30/2019 as well as the plain x-rays with show a degenerative spondylolisthesis and some spinal stenosis at L4-L5, actually worse on the left, right but with root compression.  Agree with the report    Medical Decision Making:  I described and recommended a lumbar decompression and fusion with posterior lumbar interbody fusion (PLIF) and Sextant pedicle screw fixation at L4/5 using spinal navigation. The goal of surgery is relief of radiating leg pain, improvement of numbness, tingling, and weakness, and reduction in overall low back pain. The  risks include, but are not limited to, infection, hemorrhage requiring transfusion or reoperation, CSF leak requiring reoperation, incomplete relief of symptoms, psuedoarthrosis resulting in chronic low back pain, hardward problems requiring revision or removal, potential need for additional surgery in the future, stroke, paralysis, coma, and death. The patient would like to think about it and will let us know if he would like to proceed.      Duane was seen today for back pain, leg pain, hip pain and extremity weakness.    Diagnoses and all orders for this visit:    Spinal stenosis of lumbar region with neurogenic claudication    Spondylolisthesis at L4-L5 level      Return for already has f/u visit.

## 2019-12-12 NOTE — H&P (VIEW-ONLY)
Subjective   Patient ID: Duane Hemphill is a 64 y.o. male is here today for follow-up.  Patient went for PAT's today and his BP was 180/102, his BP is 174/ 91 now in the office     Patient is scheduled for surgery 12.17.19, L4/5 open decompression and posterior interbody fusion with cage/sextant pedicle screw fixation    Patient was last seen 9.30.19 for constant low back, right leg and hip pain and right leg weakness.  Patient states that his symptoms are unchanged.     He is taking Gabapentin 100 mg TID    He is scheduled to have surgery tomorrow consisting of an L4-L5 decompression and fusion.  I had to see him prior to the surgery because I had not seen him in almost 3 months.  He continues to have low back pain and right buttock and leg pain, but his symptoms have progressed to now involve the left buttock and leg and worsening of his back pain.  He feels very unsteady and is having more pain when he stands and walks.  He does not have any outright motor deficits or cauda equina syndrome, but his pathology is as described below.  We will move forward with his L4-L5 decompression and fusion with pedicle screw fixation tomorrow.    He has been describing some increase in his blood pressure probably from pain.  But we will have the hospitalist manage his blood pressure as well.  Frankly I think that once his pain is under better control his blood pressure will come down accordingly.      Back Pain   The problem occurs constantly. The problem is unchanged. The pain is present in the lumbar spine. The pain radiates to the right thigh. The pain is at a severity of 4/10. The pain is moderate. Associated symptoms include leg pain and weakness. Pertinent negatives include no numbness.   Leg Pain    The pain is present in the right leg and right hip. The pain is at a severity of 4/10. The pain is moderate. The pain has been constant since onset. Pertinent negatives include no numbness.   Extremity Weakness    The pain is  present in the right upper leg and right lower leg. The problem occurs constantly. The problem has been unchanged. The pain is at a severity of 5/10. The pain is moderate. Pertinent negatives include no numbness.       The following portions of the patient's history were reviewed and updated as appropriate: allergies, current medications, past family history, past medical history, past social history, past surgical history and problem list.    Review of Systems   Musculoskeletal: Positive for back pain and extremity weakness. Negative for arthralgias, gait problem and myalgias.   Neurological: Positive for weakness. Negative for numbness.   All other systems reviewed and are negative.      Objective   Physical Exam   Constitutional: He is oriented to person, place, and time. He appears well-developed and well-nourished.   HENT:   Head: Normocephalic and atraumatic.   Eyes: Pupils are equal, round, and reactive to light. Conjunctivae and EOM are normal.   Fundoscopic exam:       The right eye shows no papilledema. The right eye shows venous pulsations.        The left eye shows no papilledema. The left eye shows venous pulsations.   Neck: Carotid bruit is not present.   Neurological: He is oriented to person, place, and time. He has a normal Finger-Nose-Finger Test and a normal Heel to Shin Test. Gait normal.   Reflex Scores:       Tricep reflexes are 2+ on the right side and 2+ on the left side.       Bicep reflexes are 2+ on the right side and 2+ on the left side.       Brachioradialis reflexes are 2+ on the right side and 2+ on the left side.       Patellar reflexes are 2+ on the right side and 2+ on the left side.       Achilles reflexes are 2+ on the right side and 2+ on the left side.  Psychiatric: His speech is normal.     Neurologic Exam     Mental Status   Oriented to person, place, and time.   Registration of memory: Good recent and remote memory.   Attention: normal. Concentration: normal.   Speech: speech  is normal   Level of consciousness: alert  Knowledge: consistent with education.     Cranial Nerves     CN II   Visual fields full to confrontation.   Visual acuity: normal    CN III, IV, VI   Pupils are equal, round, and reactive to light.  Extraocular motions are normal.     CN V   Facial sensation intact.   Right corneal reflex: normal  Left corneal reflex: normal    CN VII   Facial expression full, symmetric.   Right facial weakness: none  Left facial weakness: none    CN VIII   Hearing: intact    CN IX, X   Palate: symmetric    CN XI   Right sternocleidomastoid strength: normal  Left sternocleidomastoid strength: normal    CN XII   Tongue: not atrophic  Tongue deviation: none    Motor Exam   Muscle bulk: normal  Right arm tone: normal  Left arm tone: normal  Right leg tone: normal  Left leg tone: normal    Strength   Strength 5/5 except as noted.     Sensory Exam   Light touch normal.     Gait, Coordination, and Reflexes     Gait  Gait: normal    Coordination   Finger to nose coordination: normal  Heel to shin coordination: normal    Reflexes   Right brachioradialis: 2+  Left brachioradialis: 2+  Right biceps: 2+  Left biceps: 2+  Right triceps: 2+  Left triceps: 2+  Right patellar: 2+  Left patellar: 2+  Right achilles: 2+  Left achilles: 2+  Right : 2+  Left : 2+      Assessment/Plan   Independent Review of Radiographic Studies:    Viewed the lumbar MRI done 9/30/2019 as well as the plain x-rays with show a degenerative spondylolisthesis and some spinal stenosis at L4-L5, actually worse on the left, right but with root compression.  Agree with the report    Medical Decision Making:  I described and recommended a lumbar decompression and fusion with posterior lumbar interbody fusion (PLIF) and Sextant pedicle screw fixation at L4/5 using spinal navigation. The goal of surgery is relief of radiating leg pain, improvement of numbness, tingling, and weakness, and reduction in overall low back pain. The  risks include, but are not limited to, infection, hemorrhage requiring transfusion or reoperation, CSF leak requiring reoperation, incomplete relief of symptoms, psuedoarthrosis resulting in chronic low back pain, hardward problems requiring revision or removal, potential need for additional surgery in the future, stroke, paralysis, coma, and death. The patient would like to think about it and will let us know if he would like to proceed.      Duane was seen today for back pain, leg pain, hip pain and extremity weakness.    Diagnoses and all orders for this visit:    Spinal stenosis of lumbar region with neurogenic claudication    Spondylolisthesis at L4-L5 level      Return for already has f/u visit.                  [facility]

## 2019-12-16 ENCOUNTER — APPOINTMENT (OUTPATIENT)
Dept: PREADMISSION TESTING | Facility: HOSPITAL | Age: 64
End: 2019-12-16

## 2019-12-16 ENCOUNTER — OFFICE VISIT (OUTPATIENT)
Dept: NEUROSURGERY | Facility: CLINIC | Age: 64
End: 2019-12-16

## 2019-12-16 VITALS
HEART RATE: 67 BPM | SYSTOLIC BLOOD PRESSURE: 174 MMHG | HEIGHT: 69 IN | BODY MASS INDEX: 28.02 KG/M2 | DIASTOLIC BLOOD PRESSURE: 91 MMHG

## 2019-12-16 VITALS
OXYGEN SATURATION: 98 % | HEART RATE: 65 BPM | WEIGHT: 189.8 LBS | DIASTOLIC BLOOD PRESSURE: 111 MMHG | RESPIRATION RATE: 16 BRPM | BODY MASS INDEX: 28.11 KG/M2 | HEIGHT: 69 IN | SYSTOLIC BLOOD PRESSURE: 192 MMHG | TEMPERATURE: 98.2 F

## 2019-12-16 DIAGNOSIS — M43.16 SPONDYLOLISTHESIS AT L4-L5 LEVEL: ICD-10-CM

## 2019-12-16 DIAGNOSIS — Z01.818 PRE-OP TESTING: ICD-10-CM

## 2019-12-16 DIAGNOSIS — M48.062 SPINAL STENOSIS OF LUMBAR REGION WITH NEUROGENIC CLAUDICATION: Primary | ICD-10-CM

## 2019-12-16 LAB
ABO GROUP BLD: NORMAL
ANION GAP SERPL CALCULATED.3IONS-SCNC: 11.2 MMOL/L (ref 5–15)
APTT PPP: 28.2 SECONDS (ref 22.7–35.4)
BASOPHILS # BLD AUTO: 0.09 10*3/MM3 (ref 0–0.2)
BASOPHILS NFR BLD AUTO: 1.1 % (ref 0–1.5)
BILIRUB UR QL STRIP: NEGATIVE
BLD GP AB SCN SERPL QL: NEGATIVE
BUN BLD-MCNC: 18 MG/DL (ref 8–23)
BUN/CREAT SERPL: 16.4 (ref 7–25)
CALCIUM SPEC-SCNC: 9.6 MG/DL (ref 8.6–10.5)
CHLORIDE SERPL-SCNC: 101 MMOL/L (ref 98–107)
CLARITY UR: CLEAR
CO2 SERPL-SCNC: 25.8 MMOL/L (ref 22–29)
COLOR UR: YELLOW
CREAT BLD-MCNC: 1.1 MG/DL (ref 0.76–1.27)
DEPRECATED RDW RBC AUTO: 44.4 FL (ref 37–54)
EOSINOPHIL # BLD AUTO: 0.23 10*3/MM3 (ref 0–0.4)
EOSINOPHIL NFR BLD AUTO: 2.9 % (ref 0.3–6.2)
ERYTHROCYTE [DISTWIDTH] IN BLOOD BY AUTOMATED COUNT: 12.9 % (ref 12.3–15.4)
GFR SERPL CREATININE-BSD FRML MDRD: 67 ML/MIN/1.73
GLUCOSE BLD-MCNC: 99 MG/DL (ref 65–99)
GLUCOSE UR STRIP-MCNC: NEGATIVE MG/DL
HCT VFR BLD AUTO: 45.2 % (ref 37.5–51)
HGB BLD-MCNC: 16.2 G/DL (ref 13–17.7)
HGB UR QL STRIP.AUTO: NEGATIVE
IMM GRANULOCYTES # BLD AUTO: 0.03 10*3/MM3 (ref 0–0.05)
IMM GRANULOCYTES NFR BLD AUTO: 0.4 % (ref 0–0.5)
INR PPP: 0.9 (ref 0.9–1.1)
KETONES UR QL STRIP: NEGATIVE
LEUKOCYTE ESTERASE UR QL STRIP.AUTO: NEGATIVE
LYMPHOCYTES # BLD AUTO: 2.42 10*3/MM3 (ref 0.7–3.1)
LYMPHOCYTES NFR BLD AUTO: 30.7 % (ref 19.6–45.3)
MCH RBC QN AUTO: 33.2 PG (ref 26.6–33)
MCHC RBC AUTO-ENTMCNC: 35.8 G/DL (ref 31.5–35.7)
MCV RBC AUTO: 92.6 FL (ref 79–97)
MONOCYTES # BLD AUTO: 0.62 10*3/MM3 (ref 0.1–0.9)
MONOCYTES NFR BLD AUTO: 7.9 % (ref 5–12)
NEUTROPHILS # BLD AUTO: 4.5 10*3/MM3 (ref 1.7–7)
NEUTROPHILS NFR BLD AUTO: 57 % (ref 42.7–76)
NITRITE UR QL STRIP: NEGATIVE
NRBC BLD AUTO-RTO: 0 /100 WBC (ref 0–0.2)
PH UR STRIP.AUTO: 6.5 [PH] (ref 5–8)
PLATELET # BLD AUTO: 323 10*3/MM3 (ref 140–450)
PMV BLD AUTO: 10.6 FL (ref 6–12)
POTASSIUM BLD-SCNC: 4.4 MMOL/L (ref 3.5–5.2)
PROT UR QL STRIP: NEGATIVE
PROTHROMBIN TIME: 11.9 SECONDS (ref 11.7–14.2)
RBC # BLD AUTO: 4.88 10*6/MM3 (ref 4.14–5.8)
RH BLD: POSITIVE
SODIUM BLD-SCNC: 138 MMOL/L (ref 136–145)
SP GR UR STRIP: 1.02 (ref 1–1.03)
T&S EXPIRATION DATE: NORMAL
UROBILINOGEN UR QL STRIP: NORMAL
WBC NRBC COR # BLD: 7.89 10*3/MM3 (ref 3.4–10.8)

## 2019-12-16 PROCEDURE — 85610 PROTHROMBIN TIME: CPT | Performed by: NEUROLOGICAL SURGERY

## 2019-12-16 PROCEDURE — 93005 ELECTROCARDIOGRAM TRACING: CPT

## 2019-12-16 PROCEDURE — 86901 BLOOD TYPING SEROLOGIC RH(D): CPT | Performed by: NEUROLOGICAL SURGERY

## 2019-12-16 PROCEDURE — 86900 BLOOD TYPING SEROLOGIC ABO: CPT | Performed by: NEUROLOGICAL SURGERY

## 2019-12-16 PROCEDURE — 99214 OFFICE O/P EST MOD 30 MIN: CPT | Performed by: NEUROLOGICAL SURGERY

## 2019-12-16 PROCEDURE — 86850 RBC ANTIBODY SCREEN: CPT | Performed by: NEUROLOGICAL SURGERY

## 2019-12-16 PROCEDURE — 85025 COMPLETE CBC W/AUTO DIFF WBC: CPT | Performed by: NEUROLOGICAL SURGERY

## 2019-12-16 PROCEDURE — 36415 COLL VENOUS BLD VENIPUNCTURE: CPT

## 2019-12-16 PROCEDURE — 85730 THROMBOPLASTIN TIME PARTIAL: CPT | Performed by: NEUROLOGICAL SURGERY

## 2019-12-16 PROCEDURE — 81003 URINALYSIS AUTO W/O SCOPE: CPT | Performed by: NEUROLOGICAL SURGERY

## 2019-12-16 PROCEDURE — 80048 BASIC METABOLIC PNL TOTAL CA: CPT | Performed by: NEUROLOGICAL SURGERY

## 2019-12-16 PROCEDURE — 93010 ELECTROCARDIOGRAM REPORT: CPT | Performed by: INTERNAL MEDICINE

## 2019-12-16 NOTE — DISCHARGE INSTRUCTIONS
Take the following medications the morning of surgery: GABAPENTIN        General Instructions:  • Do not eat solid food after midnight the night before surgery.  • You may drink clear liquids day of surgery but must stop at least one hour before your hospital arrival time.  • It is beneficial for you to have a clear drink that contains carbohydrates the day of surgery.  • We suggest a 12 to 20 ounce bottle of Gatorade or Powerade for non-diabetic patients or a 12 to 20 ounce bottle of G2 or Powerade Zero for diabetic patients.    Clear liquids are liquids you can see through.  Nothing red in color.     Plain water                               Sports drinks  Sodas                                   Gelatin (Jell-O)  Fruit juices without pulp such as white grape juice and apple juice  Popsicles that contain no fruit or yogurt  Tea or coffee (no cream or milk added)  Gatorade / Powerade  G2 / Powerade Zero    • Patients who avoid smoking, chewing tobacco and alcohol for 4 weeks prior to surgery have a reduced risk of post-operative complications.  Quit smoking as many days before surgery as you can.  • Do not smoke, use chewing tobacco or drink alcohol the day of surgery.   • If applicable bring your C-PAP/ BI-PAP machine.  • Bring any papers given to you in the doctor’s office.  • Wear clean comfortable clothes.  • Do not wear contact lenses, false eyelashes or make-up.  Bring a case for your glasses.   • Remove all piercings.  Leave jewelry and any other valuables at home  • The Pre-Admission Testing nurse will instruct you to bring medications if unable to obtain an accurate list in Pre-Admission Testing.        If you were given a blood bank ID arm band remember to bring it with you the day of surgery.    Preventing a Surgical Site Infection:  • For 2 to 3 days before surgery, avoid shaving with a razor because the razor can irritate skin and make it easier to develop an infection.    • Any areas of open skin can  increase the risk of a post-operative wound infection by allowing bacteria to enter and travel throughout the body.  Notify your surgeon if you have any skin wounds / rashes even if it is not near the expected surgical site.  The area will need assessed to determine if surgery should be delayed until it is healed.  • The night prior to surgery sleep in a clean bed with clean clothing.  Do not allow pets to sleep with you.  • Shower on the morning of surgery using a fresh bar of anti-bacterial soap (such as Dial) and clean washcloth.  Dry with a clean towel and dress in clean clothing.  • Ask your surgeon if you will be receiving antibiotics prior to surgery.  • Make sure you, your family, and all healthcare providers clean their hands with soap and water or an alcohol based hand  before caring for you or your wound.    Day of surgery: 12/17/2019. MAIN OR. ARRIVAL TIME 6 AM  Your arrival time is approximately two hours before your scheduled surgery time.  Upon arrival, a Pre-op nurse and Anesthesiologist will review your health history, obtain vital signs, and answer questions you may have.  The only belongings needed at this time will be a list of your home medications and if applicable your C-PAP/BI-PAP machine.  If you are staying overnight your family can leave the rest of your belongings in the car and bring them to your room later.  A Pre-op nurse will start an IV and you may receive medication in preparation for surgery, including something to help you relax.  Your family will be able to see you in the Pre-op area.  Two visitors at a time will be allowed in the Pre-op room.  While you are in surgery your family should notify the waiting room  if they leave the waiting room area and provide a contact phone number.    Please be aware that surgery does come with discomfort.  We want to make every effort to control your discomfort so please discuss any uncontrolled symptoms with your nurse.    Your doctor will most likely have prescribed pain medications.          If you are staying overnight following surgery, you will be transported to your hospital room following the recovery period.  [facility] has all private rooms.    If you have any questions please call Pre-Admission Testing at [phone].  Deductibles and co-payments are collected on the day of service. Please be prepared to pay the required co-pay, deductible or deposit on the day of service as defined by your plan.      2% CHLORHEXIDINE GLUCONATE* CLOTH  Preparing or “prepping” skin before surgery can reduce the risk of infection at the surgical site. To make the process easier, [facility] has chosen disposable cloths moistened with a rinse-free, 2% Chlorhexidine Gluconate (CHG) antiseptic solution. The steps below outline the prepping process and should be carefully followed.        Use the prep cloth on the area that is circled in the diagram             Directions Night before Surgery  1) Shower using a fresh bar of anti-bacterial soap (such as Dial) and clean washcloth.  Use a clean towel to completely dry your skin.  2) Do not use any lotions, oils or creams on your skin.  3) Open the package and remove 1 cloth, wipe your skin for 30 seconds in a circular motion.  Allow to dry for 3 minutes.  4) Repeat #3 with second cloth.  5) Do not touch your eyes, ears, or mouth with the prep cloth.  6) Allow the wet prep solution to air dry.  7) Discard the prep cloth and wash your hands with soap and water.   8) Dress in clean bed clothes and sleep on fresh clean bed sheets.   9) You may experience some temporary itching after the prep.    Directions Day of Surgery  1) Repeat steps 1,2,3,4,5,6,7, and 9.   2) Dress in clean clothes before coming to the hospital.

## 2019-12-17 ENCOUNTER — APPOINTMENT (OUTPATIENT)
Dept: GENERAL RADIOLOGY | Facility: HOSPITAL | Age: 64
End: 2019-12-17

## 2019-12-17 ENCOUNTER — ANESTHESIA (OUTPATIENT)
Dept: PERIOP | Facility: HOSPITAL | Age: 64
End: 2019-12-17

## 2019-12-17 ENCOUNTER — ANESTHESIA EVENT (OUTPATIENT)
Dept: PERIOP | Facility: HOSPITAL | Age: 64
End: 2019-12-17

## 2019-12-17 ENCOUNTER — HOSPITAL ENCOUNTER (INPATIENT)
Facility: HOSPITAL | Age: 64
LOS: 2 days | Discharge: HOME | End: 2019-12-19
Attending: NEUROLOGICAL SURGERY | Admitting: NEUROLOGICAL SURGERY

## 2019-12-17 DIAGNOSIS — M48.062 SPINAL STENOSIS OF LUMBAR REGION WITH NEUROGENIC CLAUDICATION: Primary | ICD-10-CM

## 2019-12-17 DIAGNOSIS — M43.16 SPONDYLOLISTHESIS AT L4-L5 LEVEL: ICD-10-CM

## 2019-12-17 PROBLEM — M48.061 SPINAL STENOSIS OF LUMBAR REGION: Status: ACTIVE | Noted: 2019-12-17

## 2019-12-17 PROCEDURE — 25010000002 SUCCINYLCHOLINE PER 20 MG: Performed by: NURSE ANESTHETIST, CERTIFIED REGISTERED

## 2019-12-17 PROCEDURE — C1769 GUIDE WIRE: HCPCS | Performed by: NEUROLOGICAL SURGERY

## 2019-12-17 PROCEDURE — 0SG00AJ FUSION OF LUMBAR VERTEBRAL JOINT WITH INTERBODY FUSION DEVICE, POSTERIOR APPROACH, ANTERIOR COLUMN, OPEN APPROACH: ICD-10-PCS | Performed by: NEUROLOGICAL SURGERY

## 2019-12-17 PROCEDURE — 25010000002 PROPOFOL 10 MG/ML EMULSION: Performed by: NURSE ANESTHETIST, CERTIFIED REGISTERED

## 2019-12-17 PROCEDURE — 25010000002 DEXAMETHASONE PER 1 MG: Performed by: NURSE ANESTHETIST, CERTIFIED REGISTERED

## 2019-12-17 PROCEDURE — 25010000002 HYDROMORPHONE PER 4 MG: Performed by: NURSE ANESTHETIST, CERTIFIED REGISTERED

## 2019-12-17 PROCEDURE — 25010000002 FENTANYL CITRATE (PF) 100 MCG/2ML SOLUTION: Performed by: NURSE ANESTHETIST, CERTIFIED REGISTERED

## 2019-12-17 PROCEDURE — 25010000002 ONDANSETRON PER 1 MG: Performed by: NURSE ANESTHETIST, CERTIFIED REGISTERED

## 2019-12-17 PROCEDURE — C1713 ANCHOR/SCREW BN/BN,TIS/BN: HCPCS | Performed by: NEUROLOGICAL SURGERY

## 2019-12-17 PROCEDURE — 25010000003 CEFAZOLIN IN DEXTROSE 2-4 GM/100ML-% SOLUTION: Performed by: NEUROLOGICAL SURGERY

## 2019-12-17 PROCEDURE — 61783 SCAN PROC SPINAL: CPT | Performed by: NEUROLOGICAL SURGERY

## 2019-12-17 PROCEDURE — 25010000002 MIDAZOLAM PER 1 MG: Performed by: ANESTHESIOLOGY

## 2019-12-17 PROCEDURE — 22853 INSJ BIOMECHANICAL DEVICE: CPT | Performed by: NEUROLOGICAL SURGERY

## 2019-12-17 PROCEDURE — 25010000002 METHOCARBAMOL 1000 MG/10ML SOLUTION: Performed by: NEUROLOGICAL SURGERY

## 2019-12-17 PROCEDURE — 25010000003 CEFAZOLIN PER 500 MG: Performed by: NEUROLOGICAL SURGERY

## 2019-12-17 PROCEDURE — 72110 X-RAY EXAM L-2 SPINE 4/>VWS: CPT

## 2019-12-17 PROCEDURE — 25010000002 HEPARIN (PORCINE) PER 1000 UNITS: Performed by: NEUROLOGICAL SURGERY

## 2019-12-17 PROCEDURE — 22840 INSERT SPINE FIXATION DEVICE: CPT | Performed by: NEUROLOGICAL SURGERY

## 2019-12-17 PROCEDURE — 25010000002 NEOSTIGMINE PER 0.5 MG: Performed by: NURSE ANESTHETIST, CERTIFIED REGISTERED

## 2019-12-17 PROCEDURE — 22630 ARTHRD PST TQ 1NTRSPC LUM: CPT | Performed by: NEUROLOGICAL SURGERY

## 2019-12-17 PROCEDURE — 0SB20ZZ EXCISION OF LUMBAR VERTEBRAL DISC, OPEN APPROACH: ICD-10-PCS | Performed by: NEUROLOGICAL SURGERY

## 2019-12-17 PROCEDURE — 76000 FLUOROSCOPY <1 HR PHYS/QHP: CPT

## 2019-12-17 DEVICE — ROD 1475005045 4.75 CCM SEXTANT 45MM
Type: IMPLANTABLE DEVICE | Site: SPINE LUMBAR | Status: FUNCTIONAL
Brand: CD HORIZON® SPINAL SYSTEM

## 2019-12-17 DEVICE — PUTTY DBM GRAFTON 6CC: Type: IMPLANTABLE DEVICE | Site: SPINE LUMBAR | Status: FUNCTIONAL

## 2019-12-17 DEVICE — SPACER 2991632 CAPSTONE PEEK 16X32
Type: IMPLANTABLE DEVICE | Site: SPINE LUMBAR | Status: FUNCTIONAL
Brand: CAPSTONE® SPINAL SYSTEM

## 2019-12-17 RX ORDER — FENTANYL CITRATE 50 UG/ML
50 INJECTION, SOLUTION INTRAMUSCULAR; INTRAVENOUS
Status: DISCONTINUED | OUTPATIENT
Start: 2019-12-17 | End: 2019-12-17 | Stop reason: HOSPADM

## 2019-12-17 RX ORDER — LABETALOL HYDROCHLORIDE 5 MG/ML
5 INJECTION, SOLUTION INTRAVENOUS
Status: DISCONTINUED | OUTPATIENT
Start: 2019-12-17 | End: 2019-12-17 | Stop reason: HOSPADM

## 2019-12-17 RX ORDER — PROMETHAZINE HYDROCHLORIDE 25 MG/ML
6.25 INJECTION, SOLUTION INTRAMUSCULAR; INTRAVENOUS
Status: DISCONTINUED | OUTPATIENT
Start: 2019-12-17 | End: 2019-12-17 | Stop reason: HOSPADM

## 2019-12-17 RX ORDER — PROMETHAZINE HYDROCHLORIDE 25 MG/1
25 TABLET ORAL ONCE AS NEEDED
Status: DISCONTINUED | OUTPATIENT
Start: 2019-12-17 | End: 2019-12-17 | Stop reason: HOSPADM

## 2019-12-17 RX ORDER — ONDANSETRON 4 MG/1
4 TABLET, FILM COATED ORAL EVERY 6 HOURS PRN
Status: DISCONTINUED | OUTPATIENT
Start: 2019-12-17 | End: 2019-12-19 | Stop reason: HOSPADM

## 2019-12-17 RX ORDER — ONDANSETRON 2 MG/ML
4 INJECTION INTRAMUSCULAR; INTRAVENOUS EVERY 6 HOURS PRN
Status: DISCONTINUED | OUTPATIENT
Start: 2019-12-17 | End: 2019-12-19 | Stop reason: HOSPADM

## 2019-12-17 RX ORDER — DIPHENHYDRAMINE HCL 25 MG
25 CAPSULE ORAL
Status: DISCONTINUED | OUTPATIENT
Start: 2019-12-17 | End: 2019-12-17 | Stop reason: HOSPADM

## 2019-12-17 RX ORDER — NALOXONE HCL 0.4 MG/ML
0.2 VIAL (ML) INJECTION AS NEEDED
Status: DISCONTINUED | OUTPATIENT
Start: 2019-12-17 | End: 2019-12-17 | Stop reason: HOSPADM

## 2019-12-17 RX ORDER — EPHEDRINE SULFATE 50 MG/ML
5 INJECTION, SOLUTION INTRAVENOUS ONCE AS NEEDED
Status: DISCONTINUED | OUTPATIENT
Start: 2019-12-17 | End: 2019-12-17 | Stop reason: HOSPADM

## 2019-12-17 RX ORDER — SODIUM CHLORIDE 0.9 % (FLUSH) 0.9 %
10 SYRINGE (ML) INJECTION AS NEEDED
Status: DISCONTINUED | OUTPATIENT
Start: 2019-12-17 | End: 2019-12-19 | Stop reason: HOSPADM

## 2019-12-17 RX ORDER — GABAPENTIN 100 MG/1
100 CAPSULE ORAL 3 TIMES DAILY
Status: DISCONTINUED | OUTPATIENT
Start: 2019-12-17 | End: 2019-12-19 | Stop reason: HOSPADM

## 2019-12-17 RX ORDER — DIPHENHYDRAMINE HYDROCHLORIDE 50 MG/ML
12.5 INJECTION INTRAMUSCULAR; INTRAVENOUS
Status: DISCONTINUED | OUTPATIENT
Start: 2019-12-17 | End: 2019-12-17 | Stop reason: HOSPADM

## 2019-12-17 RX ORDER — SODIUM CHLORIDE, SODIUM LACTATE, POTASSIUM CHLORIDE, CALCIUM CHLORIDE 600; 310; 30; 20 MG/100ML; MG/100ML; MG/100ML; MG/100ML
100 INJECTION, SOLUTION INTRAVENOUS CONTINUOUS
Status: DISCONTINUED | OUTPATIENT
Start: 2019-12-17 | End: 2019-12-18

## 2019-12-17 RX ORDER — DOCUSATE SODIUM 100 MG/1
100 CAPSULE, LIQUID FILLED ORAL 2 TIMES DAILY PRN
Status: DISCONTINUED | OUTPATIENT
Start: 2019-12-17 | End: 2019-12-18

## 2019-12-17 RX ORDER — CEFAZOLIN SODIUM 2 G/100ML
2 INJECTION, SOLUTION INTRAVENOUS EVERY 8 HOURS
Status: COMPLETED | OUTPATIENT
Start: 2019-12-17 | End: 2019-12-18

## 2019-12-17 RX ORDER — SODIUM CHLORIDE, SODIUM LACTATE, POTASSIUM CHLORIDE, CALCIUM CHLORIDE 600; 310; 30; 20 MG/100ML; MG/100ML; MG/100ML; MG/100ML
9 INJECTION, SOLUTION INTRAVENOUS CONTINUOUS
Status: DISCONTINUED | OUTPATIENT
Start: 2019-12-17 | End: 2019-12-17

## 2019-12-17 RX ORDER — SUCCINYLCHOLINE CHLORIDE 20 MG/ML
INJECTION INTRAMUSCULAR; INTRAVENOUS AS NEEDED
Status: DISCONTINUED | OUTPATIENT
Start: 2019-12-17 | End: 2019-12-17 | Stop reason: SURG

## 2019-12-17 RX ORDER — MIDAZOLAM HYDROCHLORIDE 1 MG/ML
2 INJECTION INTRAMUSCULAR; INTRAVENOUS
Status: DISCONTINUED | OUTPATIENT
Start: 2019-12-17 | End: 2019-12-17 | Stop reason: HOSPADM

## 2019-12-17 RX ORDER — GLYCOPYRROLATE 0.2 MG/ML
INJECTION INTRAMUSCULAR; INTRAVENOUS AS NEEDED
Status: DISCONTINUED | OUTPATIENT
Start: 2019-12-17 | End: 2019-12-17 | Stop reason: SURG

## 2019-12-17 RX ORDER — WOUND DRESSING ADHESIVE - LIQUID
LIQUID MISCELLANEOUS AS NEEDED
Status: DISCONTINUED | OUTPATIENT
Start: 2019-12-17 | End: 2019-12-17 | Stop reason: HOSPADM

## 2019-12-17 RX ORDER — SODIUM CHLORIDE 0.9 % (FLUSH) 0.9 %
3-10 SYRINGE (ML) INJECTION AS NEEDED
Status: DISCONTINUED | OUTPATIENT
Start: 2019-12-17 | End: 2019-12-17 | Stop reason: HOSPADM

## 2019-12-17 RX ORDER — SODIUM CHLORIDE 0.9 % (FLUSH) 0.9 %
3 SYRINGE (ML) INJECTION EVERY 12 HOURS SCHEDULED
Status: DISCONTINUED | OUTPATIENT
Start: 2019-12-17 | End: 2019-12-17 | Stop reason: HOSPADM

## 2019-12-17 RX ORDER — ACETAMINOPHEN 325 MG/1
650 TABLET ORAL EVERY 4 HOURS PRN
Status: DISCONTINUED | OUTPATIENT
Start: 2019-12-17 | End: 2019-12-19 | Stop reason: HOSPADM

## 2019-12-17 RX ORDER — LIDOCAINE HYDROCHLORIDE 10 MG/ML
0.5 INJECTION, SOLUTION EPIDURAL; INFILTRATION; INTRACAUDAL; PERINEURAL ONCE AS NEEDED
Status: DISCONTINUED | OUTPATIENT
Start: 2019-12-17 | End: 2019-12-17 | Stop reason: HOSPADM

## 2019-12-17 RX ORDER — PROPOFOL 10 MG/ML
VIAL (ML) INTRAVENOUS AS NEEDED
Status: DISCONTINUED | OUTPATIENT
Start: 2019-12-17 | End: 2019-12-17 | Stop reason: SURG

## 2019-12-17 RX ORDER — ONDANSETRON 2 MG/ML
4 INJECTION INTRAMUSCULAR; INTRAVENOUS ONCE AS NEEDED
Status: DISCONTINUED | OUTPATIENT
Start: 2019-12-17 | End: 2019-12-17 | Stop reason: HOSPADM

## 2019-12-17 RX ORDER — METHOCARBAMOL 100 MG/ML
1000 INJECTION, SOLUTION INTRAMUSCULAR; INTRAVENOUS ONCE
Status: COMPLETED | OUTPATIENT
Start: 2019-12-17 | End: 2019-12-17

## 2019-12-17 RX ORDER — FLUMAZENIL 0.1 MG/ML
0.2 INJECTION INTRAVENOUS AS NEEDED
Status: DISCONTINUED | OUTPATIENT
Start: 2019-12-17 | End: 2019-12-17 | Stop reason: HOSPADM

## 2019-12-17 RX ORDER — PROMETHAZINE HYDROCHLORIDE 25 MG/ML
12.5 INJECTION, SOLUTION INTRAMUSCULAR; INTRAVENOUS ONCE AS NEEDED
Status: DISCONTINUED | OUTPATIENT
Start: 2019-12-17 | End: 2019-12-17 | Stop reason: HOSPADM

## 2019-12-17 RX ORDER — ACETAMINOPHEN 325 MG/1
650 TABLET ORAL ONCE AS NEEDED
Status: DISCONTINUED | OUTPATIENT
Start: 2019-12-17 | End: 2019-12-17 | Stop reason: HOSPADM

## 2019-12-17 RX ORDER — BUPIVACAINE HYDROCHLORIDE AND EPINEPHRINE 2.5; 5 MG/ML; UG/ML
INJECTION, SOLUTION INFILTRATION; PERINEURAL AS NEEDED
Status: DISCONTINUED | OUTPATIENT
Start: 2019-12-17 | End: 2019-12-17 | Stop reason: HOSPADM

## 2019-12-17 RX ORDER — HYDROCODONE BITARTRATE AND ACETAMINOPHEN 7.5; 325 MG/1; MG/1
1 TABLET ORAL EVERY 4 HOURS PRN
Status: DISCONTINUED | OUTPATIENT
Start: 2019-12-17 | End: 2019-12-19 | Stop reason: HOSPADM

## 2019-12-17 RX ORDER — HYDROMORPHONE HCL 110MG/55ML
PATIENT CONTROLLED ANALGESIA SYRINGE INTRAVENOUS AS NEEDED
Status: DISCONTINUED | OUTPATIENT
Start: 2019-12-17 | End: 2019-12-17 | Stop reason: SURG

## 2019-12-17 RX ORDER — ONDANSETRON 2 MG/ML
INJECTION INTRAMUSCULAR; INTRAVENOUS AS NEEDED
Status: DISCONTINUED | OUTPATIENT
Start: 2019-12-17 | End: 2019-12-17 | Stop reason: SURG

## 2019-12-17 RX ORDER — NALOXONE HCL 0.4 MG/ML
0.1 VIAL (ML) INJECTION
Status: DISCONTINUED | OUTPATIENT
Start: 2019-12-17 | End: 2019-12-19 | Stop reason: HOSPADM

## 2019-12-17 RX ORDER — OXYCODONE AND ACETAMINOPHEN 7.5; 325 MG/1; MG/1
1 TABLET ORAL ONCE AS NEEDED
Status: DISCONTINUED | OUTPATIENT
Start: 2019-12-17 | End: 2019-12-17 | Stop reason: HOSPADM

## 2019-12-17 RX ORDER — HYDROMORPHONE HYDROCHLORIDE 1 MG/ML
0.5 INJECTION, SOLUTION INTRAMUSCULAR; INTRAVENOUS; SUBCUTANEOUS
Status: DISCONTINUED | OUTPATIENT
Start: 2019-12-17 | End: 2019-12-17 | Stop reason: HOSPADM

## 2019-12-17 RX ORDER — CEFAZOLIN SODIUM 2 G/100ML
2 INJECTION, SOLUTION INTRAVENOUS ONCE
Status: COMPLETED | OUTPATIENT
Start: 2019-12-17 | End: 2019-12-17

## 2019-12-17 RX ORDER — HYDRALAZINE HYDROCHLORIDE 20 MG/ML
5 INJECTION INTRAMUSCULAR; INTRAVENOUS
Status: DISCONTINUED | OUTPATIENT
Start: 2019-12-17 | End: 2019-12-17 | Stop reason: HOSPADM

## 2019-12-17 RX ORDER — FENTANYL CITRATE 50 UG/ML
INJECTION, SOLUTION INTRAMUSCULAR; INTRAVENOUS AS NEEDED
Status: DISCONTINUED | OUTPATIENT
Start: 2019-12-17 | End: 2019-12-17 | Stop reason: SURG

## 2019-12-17 RX ORDER — SENNA AND DOCUSATE SODIUM 50; 8.6 MG/1; MG/1
1 TABLET, FILM COATED ORAL NIGHTLY PRN
Status: DISCONTINUED | OUTPATIENT
Start: 2019-12-17 | End: 2019-12-18

## 2019-12-17 RX ORDER — PROMETHAZINE HYDROCHLORIDE 25 MG/1
25 SUPPOSITORY RECTAL ONCE AS NEEDED
Status: DISCONTINUED | OUTPATIENT
Start: 2019-12-17 | End: 2019-12-17 | Stop reason: HOSPADM

## 2019-12-17 RX ORDER — HYDROCODONE BITARTRATE AND ACETAMINOPHEN 7.5; 325 MG/1; MG/1
1 TABLET ORAL ONCE AS NEEDED
Status: DISCONTINUED | OUTPATIENT
Start: 2019-12-17 | End: 2019-12-17 | Stop reason: HOSPADM

## 2019-12-17 RX ORDER — HYDROMORPHONE HCL IN 0.9% NACL 10 MG/50ML
PATIENT CONTROLLED ANALGESIA SYRINGE INTRAVENOUS CONTINUOUS
Status: DISCONTINUED | OUTPATIENT
Start: 2019-12-17 | End: 2019-12-18

## 2019-12-17 RX ORDER — SODIUM CHLORIDE 0.9 % (FLUSH) 0.9 %
3 SYRINGE (ML) INJECTION EVERY 12 HOURS SCHEDULED
Status: DISCONTINUED | OUTPATIENT
Start: 2019-12-17 | End: 2019-12-19 | Stop reason: HOSPADM

## 2019-12-17 RX ORDER — LIDOCAINE HYDROCHLORIDE 20 MG/ML
INJECTION, SOLUTION INFILTRATION; PERINEURAL AS NEEDED
Status: DISCONTINUED | OUTPATIENT
Start: 2019-12-17 | End: 2019-12-17 | Stop reason: SURG

## 2019-12-17 RX ORDER — ROCURONIUM BROMIDE 10 MG/ML
INJECTION, SOLUTION INTRAVENOUS AS NEEDED
Status: DISCONTINUED | OUTPATIENT
Start: 2019-12-17 | End: 2019-12-17 | Stop reason: SURG

## 2019-12-17 RX ORDER — CYCLOBENZAPRINE HCL 10 MG
10 TABLET ORAL 3 TIMES DAILY PRN
Status: DISCONTINUED | OUTPATIENT
Start: 2019-12-17 | End: 2019-12-19 | Stop reason: HOSPADM

## 2019-12-17 RX ORDER — DEXAMETHASONE SODIUM PHOSPHATE 10 MG/ML
INJECTION INTRAMUSCULAR; INTRAVENOUS AS NEEDED
Status: DISCONTINUED | OUTPATIENT
Start: 2019-12-17 | End: 2019-12-17 | Stop reason: SURG

## 2019-12-17 RX ORDER — FAMOTIDINE 10 MG/ML
20 INJECTION, SOLUTION INTRAVENOUS ONCE
Status: COMPLETED | OUTPATIENT
Start: 2019-12-17 | End: 2019-12-17

## 2019-12-17 RX ORDER — MIDAZOLAM HYDROCHLORIDE 1 MG/ML
1 INJECTION INTRAMUSCULAR; INTRAVENOUS
Status: DISCONTINUED | OUTPATIENT
Start: 2019-12-17 | End: 2019-12-17 | Stop reason: HOSPADM

## 2019-12-17 RX ADMIN — FENTANYL CITRATE 100 MCG: 50 INJECTION INTRAMUSCULAR; INTRAVENOUS at 08:58

## 2019-12-17 RX ADMIN — CEFAZOLIN SODIUM 2 G: 2 INJECTION, SOLUTION INTRAVENOUS at 13:00

## 2019-12-17 RX ADMIN — ROCURONIUM BROMIDE 10 MG: 10 INJECTION INTRAVENOUS at 11:00

## 2019-12-17 RX ADMIN — PROPOFOL 200 MG: 10 INJECTION, EMULSION INTRAVENOUS at 08:58

## 2019-12-17 RX ADMIN — CEFAZOLIN SODIUM 2 G: 2 INJECTION, SOLUTION INTRAVENOUS at 08:57

## 2019-12-17 RX ADMIN — LIDOCAINE HYDROCHLORIDE 100 MG: 20 INJECTION, SOLUTION INFILTRATION; PERINEURAL at 08:58

## 2019-12-17 RX ADMIN — FAMOTIDINE 20 MG: 10 INJECTION INTRAVENOUS at 07:37

## 2019-12-17 RX ADMIN — ONDANSETRON HYDROCHLORIDE 4 MG: 2 SOLUTION INTRAMUSCULAR; INTRAVENOUS at 13:27

## 2019-12-17 RX ADMIN — NEOSTIGMINE METHYLSULFATE 3 MG: 1 INJECTION INTRAMUSCULAR; INTRAVENOUS; SUBCUTANEOUS at 13:25

## 2019-12-17 RX ADMIN — HYDROMORPHONE HYDROCHLORIDE 1 MG: 2 INJECTION, SOLUTION INTRAMUSCULAR; INTRAVENOUS; SUBCUTANEOUS at 11:05

## 2019-12-17 RX ADMIN — ROCURONIUM BROMIDE 50 MG: 10 INJECTION INTRAVENOUS at 09:01

## 2019-12-17 RX ADMIN — FENTANYL CITRATE 50 MCG: 50 INJECTION INTRAMUSCULAR; INTRAVENOUS at 09:31

## 2019-12-17 RX ADMIN — GABAPENTIN 100 MG: 100 CAPSULE ORAL at 22:01

## 2019-12-17 RX ADMIN — SUCCINYLCHOLINE CHLORIDE 100 MG: 20 INJECTION, SOLUTION INTRAMUSCULAR; INTRAVENOUS; PARENTERAL at 08:58

## 2019-12-17 RX ADMIN — HYDROMORPHONE HYDROCHLORIDE: 10 INJECTION, SOLUTION INTRAMUSCULAR; INTRAVENOUS; SUBCUTANEOUS at 14:16

## 2019-12-17 RX ADMIN — ROCURONIUM BROMIDE 10 MG: 10 INJECTION INTRAVENOUS at 10:00

## 2019-12-17 RX ADMIN — METHOCARBAMOL 1000 MG: 1000 INJECTION, SOLUTION INTRAMUSCULAR; INTRAVENOUS at 14:15

## 2019-12-17 RX ADMIN — FENTANYL CITRATE 50 MCG: 50 INJECTION INTRAMUSCULAR; INTRAVENOUS at 10:30

## 2019-12-17 RX ADMIN — GLYCOPYRROLATE 0.4 MG: 0.2 INJECTION INTRAMUSCULAR; INTRAVENOUS at 13:25

## 2019-12-17 RX ADMIN — DEXAMETHASONE SODIUM PHOSPHATE 4 MG: 10 INJECTION INTRAMUSCULAR; INTRAVENOUS at 09:01

## 2019-12-17 RX ADMIN — SODIUM CHLORIDE, POTASSIUM CHLORIDE, SODIUM LACTATE AND CALCIUM CHLORIDE 100 ML/HR: 600; 310; 30; 20 INJECTION, SOLUTION INTRAVENOUS at 14:19

## 2019-12-17 RX ADMIN — SODIUM CHLORIDE, PRESERVATIVE FREE 3 ML: 5 INJECTION INTRAVENOUS at 22:02

## 2019-12-17 RX ADMIN — HYDROMORPHONE HYDROCHLORIDE 0.5 MG: 1 INJECTION, SOLUTION INTRAMUSCULAR; INTRAVENOUS; SUBCUTANEOUS at 16:14

## 2019-12-17 RX ADMIN — SODIUM CHLORIDE, POTASSIUM CHLORIDE, SODIUM LACTATE AND CALCIUM CHLORIDE 9 ML/HR: 600; 310; 30; 20 INJECTION, SOLUTION INTRAVENOUS at 07:37

## 2019-12-17 RX ADMIN — FENTANYL CITRATE 50 MCG: 50 INJECTION, SOLUTION INTRAMUSCULAR; INTRAVENOUS at 16:08

## 2019-12-17 RX ADMIN — HYDROCODONE BITARTRATE AND ACETAMINOPHEN 1 TABLET: 7.5; 325 TABLET ORAL at 18:56

## 2019-12-17 RX ADMIN — CEFAZOLIN SODIUM 2 G: 2 INJECTION, SOLUTION INTRAVENOUS at 22:01

## 2019-12-17 RX ADMIN — ONDANSETRON HYDROCHLORIDE 4 MG: 2 SOLUTION INTRAMUSCULAR; INTRAVENOUS at 09:01

## 2019-12-17 RX ADMIN — MIDAZOLAM 2 MG: 1 INJECTION INTRAMUSCULAR; INTRAVENOUS at 07:37

## 2019-12-17 NOTE — OP NOTE
Preoperative diagnosis: Recurrent spinal stenosis L4/5 with bilateral neurogenic claudication    Postoperative diagnosis: Same as above    Procedures performed: Open revision L4/5 decompression with posterior lumbar interbody fusion (PLIF) with bilateral Capstone cages and percutaneous Sextant pedicle screw/speedy fixation at L4/5 using Stealth/O arm navigation    Surgeon: Wili    First Assistant: Yue Mendez  (She greatly assisted in the exposure, visualization of neural structures, control of bleeding, retraction, and closure of the incision.)    Anesthesia: GET    EBL: 150 cc    Complications: none    Specimen sent: none    Drains: 7 mm flat LACHO epidural drain    Findings: severe stenosis, epidural scar    Postoperative condition: good    Indications for the operation: The patient is a generally healthy 64-year-old man who 3 years ago underwent a right L4-5 open lumbar decompression for severe right leg pain from spinal stenosis. We always knew he was stenotic on the other side. He did well from his surgery for about 1-1/2 years and began having recurrent symptoms, first on the right then the left and progressive back pain. He was found to have recurrent stenosis at L4-5 and progressive stenosis on the left. Because this was a revision surgery and decompressing would probably lead to instability, I went ahead and brought him to the operating room for decompression and fusion of L4-5 once conservative treatment had failed and his quality of life had declined dramatically.       Informed consent: He understood that the goal of surgery is relief of radiating leg pain, improvement of numbness, tingling, and weakness, and reduction in overall low back pain. The risks include, but are not limited to, infection, hemorrhage requiring transfusion or reoperation, CSF leak requiring reoperation, incomplete relief of symptoms, psuedoarthrosis resulting in chronic low back pain, hardward problems requiring revision or  removal, potential need for additional surgery in the future, stroke, paralysis, coma, and death. The patient agrees to proceed.         Details of the operation: The patient was taken to the operating room and remained in his gurney in supine position. After induction of endotracheal intubation, he got 2 g of Kefzol as per the SCIP protocol. We repeated this during the case. Leon catheter was placed. SCDs were placed. Venous access was secured. He was rolled over in the prone position on a radiolucent Adrian spinal table. All pressure points were padded including the brachial plexus. We brought in the C-arm and marked out the incision in the midline at L4-L5, coincident with the old incision. The lumbar region was then prepped and draped in the usual sterile fashion as was the C-arm. We did a surgical timeout. I injected a total of 20 mL of 0.25% Marcaine with epinephrine into the paraspinous musculature bilaterally at L4-L5. A linear incision was made at L4-L5 with a #10 skin knife. Hemostasis was obtained with monopolar cautery. Dissection was taken all the way down to the midline at L4-L5. A Rose periosteum was used to strip the paraspinous musculature out laterally. There was epidural scar tissue on the right side of considerable amount, as described later. Self-retaining retractors were placed. We got initial x-rays with towel clip at L4-L5 and later on with the needle in the L4-L5 disk space. The spinous processes were removed at L4-L5 with a Leksell rongeur. Some of this was used for autograft later on. The microscope was draped and brought into the field. Its use was essential to performance of microneurosurgical technique. Using the 6 mm round nata on the pneumatic TNM Media drill I did a central laminectomy at L4-L5 all the way down to the ligamentum flavum. There was a considerable amount of epidural scar tissue in the interspace on the right from this previous surgery. I switched over to the 3 mm  lakesha and then did a medial facetectomy and foraminotomy bilaterally and the neurolysis involving the right L5 nerve root. I was able to identify the disk which was bulging but most of the compression of the bilateral L5 nerve roots with bone spur and ligamentous hypertrophy. I did a complete facetectomy, foraminotomy and then incised the disk spaces bilaterally at L4-L5 and went on to do a near-complete diskectomy at L4-L5 using reverse angle curettes, pituitaries, ring curettes and end plate scrapers. I harvested laminar autograft also for autograft to be used later on. I put the autograft into the interspace anteriorly at L4-L5 along with demineralized bone matrix and then measured out for a 16 x 32 mm Capstone cage interbody cage and I packed bilaterally 16 X 32 mm cages and placed them on each side right and left with C-arm guidance with a certain amount of recession. I was pleased with the position. Floseal was used. We provisionally closed the incision and then placed the right iliac crest pin and did an O-arm spin and then we were able to get excellent images for Stealth navigation. Percutaneously I identified the L4 and L5 pedicle entry sites bilaterally and injected each site with 5 mL of 0.25% Marcaine, made a paramedian incision at those levels and then using Stealth navigation I placed 6.5 x 55 mm screws bilaterally at L4 and 6.5 x 55 mm screws bilaterally at L5. I did a second O-arm spin ensuring good position of the screws and then used the Sextant device, first on the right then the left using the superior approach and passed a 40 mm speedy on the right and a 45 mm speedy on the left and tied these down in compression. I got excellent views, PA and lateral, after the speedy placement. Antibiotic irrigation was used. Floseal was used.  A 7 mm flat Adrian-Romero drain was left in the midline incision and exited through a separate stab incision and secured to the skin with 2-0 silk. The fascia on all 3  incisions including the paramedian incision were closed with 2-0 Vicryl interrupted suture. The subcutaneous layer was closed with 2-0 interrupted Vicryl suture. The skin was closed with running 4-0 Vicryl subcuticular. Steri-Strips were placed. The other stab incisions for the Sextant were closed with 3-0 Vicryl and 4-0 Vicryl subcuticular. Clean, dry dressing were placed. He was rolled over in supine position and extubated, taken to the recovery room in satisfactory condition.

## 2019-12-17 NOTE — PLAN OF CARE
Problem: Patient Care Overview  Goal: Plan of Care Review  Outcome: Ongoing (interventions implemented as appropriate)  Flowsheets (Taken 12/17/2019 7645)  Progress: no change  Plan of Care Reviewed With: patient  Outcome Summary: A&Ox4. Pt admitted ffrom PACU. Dressing c/d/i. Tolerating clear liquids. Dilaudid PCA controlling pain. Norco ordered PRN. HTN, otherwise VSS. 4L NC. LACHO drain to bulb suction, serousangious drainage. F/C, clear yellow urine. SCDs on. No c/o n/v. Will continue to monitor. Family at bedside.

## 2019-12-17 NOTE — ANESTHESIA PROCEDURE NOTES
Airway  Urgency: elective    Date/Time: 12/17/2019 8:59 AM  Airway not difficult    General Information and Staff    Patient location during procedure: OR  CRNA: Savannah Oh CRNA    Indications and Patient Condition  Indications for airway management: airway protection    Preoxygenated: yes  Mask difficulty assessment: 0 - not attempted    Final Airway Details  Final airway type: endotracheal airway      Successful airway: ETT  Cuffed: yes   Successful intubation technique: direct laryngoscopy  Endotracheal tube insertion site: oral  Blade: Giron  Blade size: 2  ETT size (mm): 7.5  Cormack-Lehane Classification: grade I - full view of glottis  Placement verified by: chest auscultation and capnometry   Cuff volume (mL): 10  Measured from: lips  ETT/EBT  to lips (cm): 21  Number of attempts at approach: 1    Additional Comments  Atraumatic placement of ETT, dentition unchanged from preop.

## 2019-12-17 NOTE — BRIEF OP NOTE
LUMBAR DISCECTOMY POSTERIOR WITH FUSION INSTRUMENTATION  Progress Note    Duane Hemphill  12/17/2019    Pre-op Diagnosis:   Lumbar radiculopathy [M54.16]  DDD (degenerative disc disease), lumbar [M51.36]  Chronic right hip pain [M25.551, G89.29]       Post-Op Diagnosis Codes:     * Lumbar radiculopathy [M54.16]     * DDD (degenerative disc disease), lumbar [M51.36]     * Chronic right hip pain [M25.551, G89.29]    Procedure/CPT® Codes:      Procedure(s):  LUMBAR 4 TO LUMBAR 5 OPEN DECOMPRESSION AND POSTERIOR LUMBAR INTERBODY FUSION WITH CAGE AND SEXTANT PEDICLE SCREW FIXATION WITH SPINAL NAVIGATION    Surgeon(s):  Saurav Rasheed MD    Anesthesia: General    Staff:   Cell Saver : Jayesh Meyers  Circulator: Ofe Prieto RN; Deya Farrell RN; Piedad Zamudio RN  Radiology Technologist: Heidi Lomeli, PEGGY; Saturnino Diallo  Scrub Person: Emily Burrows; Anitra Yoon  Vendor Representative: James Kramer  Assistant: Yue Mendez CSA    Estimated Blood Loss:  150 cc    Urine Voided: 300 mL    Specimens:              none          Drains:   Closed/Suction Drain 1 Midline Back Bulb (Active)       Urethral Catheter 16 Fr. (Active)       Findings: severe stenosis with epidural fibrosis    Complications: none      Saurav Rasheed MD     Date: 12/17/2019  Time: 1:24 PM

## 2019-12-17 NOTE — ANESTHESIA PREPROCEDURE EVALUATION
Anesthesia Evaluation     NPO Solid Status: > 8 hours  NPO Liquid Status: > 2 hours           Airway   Mallampati: II  TM distance: >3 FB  Neck ROM: full  no difficulty expected  Dental - normal exam     Pulmonary - normal exam   (+) a smoker Current,   Cardiovascular - normal exam    (+) hypertension,       Neuro/Psych  (+) numbness,     GI/Hepatic/Renal/Endo      Musculoskeletal     Abdominal  - normal exam   Substance History      OB/GYN          Other   arthritis,                      Anesthesia Plan    ASA 2     general     intravenous induction     Anesthetic plan, all risks, benefits, and alternatives have been provided, discussed and informed consent has been obtained with: patient.

## 2019-12-18 LAB
ANION GAP SERPL CALCULATED.3IONS-SCNC: 8.8 MMOL/L (ref 5–15)
BASOPHILS # BLD AUTO: 0.06 10*3/MM3 (ref 0–0.2)
BASOPHILS NFR BLD AUTO: 0.5 % (ref 0–1.5)
BUN BLD-MCNC: 11 MG/DL (ref 8–23)
BUN/CREAT SERPL: 11.8 (ref 7–25)
CALCIUM SPEC-SCNC: 8.5 MG/DL (ref 8.6–10.5)
CHLORIDE SERPL-SCNC: 99 MMOL/L (ref 98–107)
CO2 SERPL-SCNC: 27.2 MMOL/L (ref 22–29)
CREAT BLD-MCNC: 0.93 MG/DL (ref 0.76–1.27)
DEPRECATED RDW RBC AUTO: 43.4 FL (ref 37–54)
EOSINOPHIL # BLD AUTO: 0.06 10*3/MM3 (ref 0–0.4)
EOSINOPHIL NFR BLD AUTO: 0.5 % (ref 0.3–6.2)
ERYTHROCYTE [DISTWIDTH] IN BLOOD BY AUTOMATED COUNT: 12.7 % (ref 12.3–15.4)
GFR SERPL CREATININE-BSD FRML MDRD: 81 ML/MIN/1.73
GLUCOSE BLD-MCNC: 110 MG/DL (ref 65–99)
HCT VFR BLD AUTO: 37.3 % (ref 37.5–51)
HGB BLD-MCNC: 13.1 G/DL (ref 13–17.7)
IMM GRANULOCYTES # BLD AUTO: 0.06 10*3/MM3 (ref 0–0.05)
IMM GRANULOCYTES NFR BLD AUTO: 0.5 % (ref 0–0.5)
LYMPHOCYTES # BLD AUTO: 1.95 10*3/MM3 (ref 0.7–3.1)
LYMPHOCYTES NFR BLD AUTO: 16.3 % (ref 19.6–45.3)
MCH RBC QN AUTO: 32.7 PG (ref 26.6–33)
MCHC RBC AUTO-ENTMCNC: 35.1 G/DL (ref 31.5–35.7)
MCV RBC AUTO: 93 FL (ref 79–97)
MONOCYTES # BLD AUTO: 1.4 10*3/MM3 (ref 0.1–0.9)
MONOCYTES NFR BLD AUTO: 11.7 % (ref 5–12)
NEUTROPHILS # BLD AUTO: 8.43 10*3/MM3 (ref 1.7–7)
NEUTROPHILS NFR BLD AUTO: 70.5 % (ref 42.7–76)
NRBC BLD AUTO-RTO: 0 /100 WBC (ref 0–0.2)
PLATELET # BLD AUTO: 244 10*3/MM3 (ref 140–450)
PMV BLD AUTO: 10.6 FL (ref 6–12)
POTASSIUM BLD-SCNC: 4.1 MMOL/L (ref 3.5–5.2)
RBC # BLD AUTO: 4.01 10*6/MM3 (ref 4.14–5.8)
SODIUM BLD-SCNC: 135 MMOL/L (ref 136–145)
WBC NRBC COR # BLD: 11.96 10*3/MM3 (ref 3.4–10.8)

## 2019-12-18 PROCEDURE — 97530 THERAPEUTIC ACTIVITIES: CPT

## 2019-12-18 PROCEDURE — 25010000003 CEFAZOLIN IN DEXTROSE 2-4 GM/100ML-% SOLUTION: Performed by: NEUROLOGICAL SURGERY

## 2019-12-18 PROCEDURE — 80048 BASIC METABOLIC PNL TOTAL CA: CPT | Performed by: NEUROLOGICAL SURGERY

## 2019-12-18 PROCEDURE — 97162 PT EVAL MOD COMPLEX 30 MIN: CPT

## 2019-12-18 PROCEDURE — 85025 COMPLETE CBC W/AUTO DIFF WBC: CPT | Performed by: NEUROLOGICAL SURGERY

## 2019-12-18 PROCEDURE — 94799 UNLISTED PULMONARY SVC/PX: CPT

## 2019-12-18 PROCEDURE — 99024 POSTOP FOLLOW-UP VISIT: CPT | Performed by: NURSE PRACTITIONER

## 2019-12-18 RX ORDER — HYDROMORPHONE HYDROCHLORIDE 1 MG/ML
0.5 INJECTION, SOLUTION INTRAMUSCULAR; INTRAVENOUS; SUBCUTANEOUS
Status: DISCONTINUED | OUTPATIENT
Start: 2019-12-18 | End: 2019-12-19 | Stop reason: HOSPADM

## 2019-12-18 RX ORDER — DOCUSATE SODIUM 100 MG/1
100 CAPSULE, LIQUID FILLED ORAL DAILY
Status: DISCONTINUED | OUTPATIENT
Start: 2019-12-18 | End: 2019-12-19 | Stop reason: HOSPADM

## 2019-12-18 RX ORDER — SENNA AND DOCUSATE SODIUM 50; 8.6 MG/1; MG/1
2 TABLET, FILM COATED ORAL NIGHTLY
Status: DISCONTINUED | OUTPATIENT
Start: 2019-12-18 | End: 2019-12-19 | Stop reason: HOSPADM

## 2019-12-18 RX ADMIN — SODIUM CHLORIDE, PRESERVATIVE FREE 3 ML: 5 INJECTION INTRAVENOUS at 21:43

## 2019-12-18 RX ADMIN — HYDROCODONE BITARTRATE AND ACETAMINOPHEN 1 TABLET: 7.5; 325 TABLET ORAL at 18:54

## 2019-12-18 RX ADMIN — GABAPENTIN 100 MG: 100 CAPSULE ORAL at 21:42

## 2019-12-18 RX ADMIN — CYCLOBENZAPRINE 10 MG: 10 TABLET, FILM COATED ORAL at 07:51

## 2019-12-18 RX ADMIN — SENNOSIDES AND DOCUSATE SODIUM 2 TABLET: 8.6; 5 TABLET ORAL at 21:43

## 2019-12-18 RX ADMIN — GABAPENTIN 100 MG: 100 CAPSULE ORAL at 16:14

## 2019-12-18 RX ADMIN — GABAPENTIN 100 MG: 100 CAPSULE ORAL at 07:51

## 2019-12-18 RX ADMIN — CEFAZOLIN SODIUM 2 G: 2 INJECTION, SOLUTION INTRAVENOUS at 05:57

## 2019-12-18 RX ADMIN — CEFAZOLIN SODIUM 2 G: 2 INJECTION, SOLUTION INTRAVENOUS at 14:26

## 2019-12-18 RX ADMIN — SODIUM CHLORIDE, POTASSIUM CHLORIDE, SODIUM LACTATE AND CALCIUM CHLORIDE 100 ML/HR: 600; 310; 30; 20 INJECTION, SOLUTION INTRAVENOUS at 01:38

## 2019-12-18 RX ADMIN — DOCUSATE SODIUM 100 MG: 100 CAPSULE, LIQUID FILLED ORAL at 14:26

## 2019-12-18 RX ADMIN — HYDROCODONE BITARTRATE AND ACETAMINOPHEN 1 TABLET: 7.5; 325 TABLET ORAL at 06:56

## 2019-12-18 RX ADMIN — CYCLOBENZAPRINE 10 MG: 10 TABLET, FILM COATED ORAL at 16:14

## 2019-12-18 RX ADMIN — HYDROCODONE BITARTRATE AND ACETAMINOPHEN 1 TABLET: 7.5; 325 TABLET ORAL at 11:03

## 2019-12-18 RX ADMIN — HYDROCODONE BITARTRATE AND ACETAMINOPHEN 1 TABLET: 7.5; 325 TABLET ORAL at 14:59

## 2019-12-18 RX ADMIN — SODIUM CHLORIDE, PRESERVATIVE FREE 3 ML: 5 INJECTION INTRAVENOUS at 07:52

## 2019-12-18 NOTE — PLAN OF CARE
Problem: Patient Care Overview  Goal: Plan of Care Review  Outcome: Ongoing (interventions implemented as appropriate)  Flowsheets (Taken 12/18/2019 0323)  Plan of Care Reviewed With: patient  Outcome Summary: discussed rewards of quitting Smoking. pain well controlled.     Problem: Fall Risk (Adult)  Goal: Identify Related Risk Factors and Signs and Symptoms  Outcome: Ongoing (interventions implemented as appropriate)

## 2019-12-18 NOTE — PROGRESS NOTES
Discharge Planning Assessment  [facility]     Patient Name: Duane Hemphill  MRN: [id number]  Today's Date: 12/18/2019    Admit Date: 12/17/2019    Discharge Needs Assessment     Row Name 12/18/19 1409       Living Environment    Lives With  spouse    Name(s) of Who Lives With Patient  spouse Billie Hemphill [phone]     Current Living Arrangements  home/apartment/condo    Primary Care Provided by  self;spouse/significant other    Provides Primary Care For  no one, unable/limited ability to care for self    Family Caregiver if Needed  spouse    Family Caregiver Names  spouse Billie Hemphill [phone]     Quality of Family Relationships  helpful;involved;supportive    Able to Return to Prior Arrangements  yes       Resource/Environmental Concerns    Resource/Environmental Concerns  home accessibility    Home Accessibility Concerns  stairs to enter home 2 steps with no handrails to enter his single story home with a basement but he will not be using the basement of the home upon d/c       Transition Planning    Patient/Family Anticipates Transition to  home with family;home with help/services    Patient/Family Anticipated Services at Transition  home health care    Transportation Anticipated  family or friend will provide       Discharge Needs Assessment    Concerns to be Addressed  discharge planning    Equipment Currently Used at Home  cane, straight    Anticipated Changes Related to Illness  none    Equipment Needed After Discharge  none    Outpatient/Agency/Support Group Needs  homecare agency    Discharge Facility/Level of Care Needs  home with home health    Current Discharge Risk  physical impairment        Discharge Plan     Row Name 12/18/19 5174       Plan    Plan  Plans home with assistance from his wife.  Follow to see if he will need any IV antibiotics upon d/c if so he will need another HH agency as [facility] does not provide nursing care and that is the agency that he wants to use for HH P.T.     Provided post acute provider list?  Refused    Patient/Family in Agreement with Plan  yes    Plan Comments  Met with the patient and his spouse Billie Hemphill [phone] at bedside.  The patient's spouse remained present with the patient's permission.  The patient plans to return home with assistance from his spouse.  He has a cane and grab bars in his shower.  The patient has 2 steps with no handrails to enter his single story home with a basement but he will not be using the basement of the home upon d/c. The patient's pharmacy is [facility] in [place] and he is agreeable to using [facility].  The patient denies any trouble remembering to take his medication or with affording his medication.  The patient denies any HH/SNF history, was provided with a HH/SNF list and CCP telephone contact number.  The patient and his wife were offered a print out from Medicare.gov for Nursing Home and HH compare and they refused as they state if HH P.T. is needed they would want to use [facility]. Referral was made to [facility] to follow for patient's HH P.T. needs.  The patient denies any POA documents and states that although he drives himself to appointments his wife will transport him home upon d/c. The patient is currently on IV Ancef and CCP will follow to see if he will need any IV antibiotics upon d/c if so he will need another HH agency as [facility] does not provide nursing care and that is the agency that he wants to use for HH P.T.   KRISTA Hdz        Destination      Coordination has not been started for this encounter.      Durable Medical Equipment      Coordination has not been started for this encounter.      Dialysis/Infusion      Coordination has not been started for this encounter.      Home Medical Care      Service Provider Request Status Selected Services Address Phone Number Fax Number    [facility] Pending - Request Sent N/A [address], [facility] [address] [phone] --      Therapy       Coordination has not been started for this encounter.      Community Resources      Coordination has not been started for this encounter.          Demographic Summary     Row Name 12/18/19 1406       General Information    Admission Type  inpatient    Arrived From  home    Referral Source  admission list    Reason for Consult  discharge planning    Preferred Language  English     Used During This Interaction  no        Functional Status     Row Name 12/18/19 1406       Functional Status    Usual Activity Tolerance  good    Current Activity Tolerance  moderate       Functional Status, IADL    Medications  assistive equipment    Meal Preparation  assistive equipment    Housekeeping  assistive equipment    Laundry  assistive equipment    Shopping  assistive equipment       Mental Status    General Appearance WDL  WDL       Mental Status Summary    Recent Changes in Mental Status/Cognitive Functioning  no changes        Psychosocial    No documentation.       Abuse/Neglect    No documentation.       Legal    No documentation.       Substance Abuse    No documentation.       Patient Forms     Row Name 12/18/19 1406       Patient Forms    Provider Choice List  Attempted    Details of attempted delivery  The patient and his wife were offered a print out from Medicare.gov for Nursing Home and HH compare and they refused as they state if HH P.T. is needed they would want to use [facility].             KRISTA Almanza

## 2019-12-18 NOTE — PLAN OF CARE
Problem: Patient Care Overview  Goal: Plan of Care Review  Flowsheets (Taken 12/18/2019 0944)  Progress: improving (Pended)  Plan of Care Reviewed With: patient;spouse (Pended)  Outcome Summary: Pt is a 64 YOM who presents s/p L4/5 decompression with posterior lumbar interbody fusion (PLIF) d/t lumbar radiculopathy. PTA, pt lived at home with spouse and was independent with all household mobility. Today, pt required Kirk for STS and CGA for bed mobility and ambulation of 120ft with FWW. During ambulation, pt took multiple rest breaks of ~15s. Recommend continued PT to improve muscular strength and endurance, decrease pain, and improve independence. (Pended)

## 2019-12-18 NOTE — PROGRESS NOTES
[facility] NEUROSURGERY PROGRESS NOTE      CC:Open revision L4/5 decompression with posterior lumbar interbody fusion (PLIF)       Subjective     Interval History: Patient is quite happy this morning.  Reports resolved leg pain.  Has expected back pain.  States that he does not feel the PCA does much but the oral medicines helps.  He is  Walking well. has siddiqui.  Tolerating diet    ROS:  MS: back pain  Neuro: No numbness, tingling, or weakness  : no difficulty voiding, no incontinence    Objective     Vital signs in last 24 hours:  Temp:  [97.4 °F (36.3 °C)-98.1 °F (36.7 °C)] 98 °F (36.7 °C)  Heart Rate:  [65-85] 65  Resp:  [16-20] 19  BP: (130-170)/() 141/100    Intake/Output this shift:  I/O last 3 completed shifts:  In: 3868.3 [I.V.:3668.3; IV Piggyback:200]  Out: 1850 [Urine:1625; Drains:225]  I/O this shift:  In: 567 [I.V.:567]  Out: 1300 [Urine:1300]    LACHO- 225 cc/since placement    LABS:  Results from last 7 days   Lab Units 12/18/19  0449 12/16/19  1339   WBC 10*3/mm3 11.96* 7.89   HEMOGLOBIN g/dL 13.1 16.2   HEMATOCRIT % 37.3* 45.2   PLATELETS 10*3/mm3 244 323     Results from last 7 days   Lab Units 12/18/19  0449 12/16/19  1339   SODIUM mmol/L 135* 138   POTASSIUM mmol/L 4.1 4.4   CHLORIDE mmol/L 99 101   CO2 mmol/L 27.2 25.8   BUN mg/dL 11 18   CREATININE mg/dL 0.93 1.10   CALCIUM mg/dL 8.5* 9.6   GLUCOSE mg/dL 110* 99         IMAGING STUDIES:  No new imaging    I personally viewed and interpreted the patient's chart.    Meds reviewed/changed: Yes    Current Facility-Administered Medications:   •  acetaminophen (TYLENOL) tablet 650 mg, 650 mg, Oral, Q4H PRN, Saurav Rasheed MD  •  ceFAZolin in dextrose (ANCEF) IVPB solution 2 g, 2 g, Intravenous, Q8H, Saurav Rasheed MD, 2 g at 12/18/19 8244  •  cyclobenzaprine (FLEXERIL) tablet 10 mg, 10 mg, Oral, TID PRN, Saurav Rasheed MD, 10 mg at 12/18/19 7448  •  docusate sodium (COLACE) capsule 100 mg, 100 mg, Oral, BID PRN, Saurav Rasheed MD  •   gabapentin (NEURONTIN) capsule 100 mg, 100 mg, Oral, TID, Saurav Rasheed MD, 100 mg at 12/18/19 0751  •  HYDROcodone-acetaminophen (NORCO) 7.5-325 MG per tablet 1 tablet, 1 tablet, Oral, Q4H PRN, Saurav Rasheed MD, 1 tablet at 12/18/19 1103  •  HYDROmorphone (DILAUDID) PCA 0.2 mg/ml 50 mL syringe, , Intravenous, Continuous, Saurav Rasheed MD, Stopped at 12/18/19 1051  •  lactated ringers infusion, 100 mL/hr, Intravenous, Continuous, Saurav Rasheed MD, Stopped at 12/18/19 1052  •  naloxone (NARCAN) injection 0.1 mg, 0.1 mg, Intravenous, Q5 Min PRN, Saurav Rasheed MD  •  ondansetron (ZOFRAN) tablet 4 mg, 4 mg, Oral, Q6H PRN **OR** ondansetron (ZOFRAN) injection 4 mg, 4 mg, Intravenous, Q6H PRN, Saurav Rasheed MD  •  senna-docusate sodium (SENOKOT-S) 8.6-50 MG tablet 1 tablet, 1 tablet, Oral, Nightly PRN, Saurav Rasheed MD  •  sodium chloride 0.9 % flush 10 mL, 10 mL, Intravenous, PRN, Saurav Rasheed MD  •  sodium chloride 0.9 % flush 3 mL, 3 mL, Intravenous, Q12H, Saurav Rasheed MD, 3 mL at 12/18/19 0752      Physical Exam:    General:   Awake, alert. Up in chair  Back:     Incisions lumbar region well approximated with no redness drainage or swelling.  Clean dry intact dressing.  LACHO to bulb suction with sanguinous output     Motor: Normal muscle strength, bulk and tone in  lower extremities.   Sensation: Normal to light touch  Station and Gait:             Working with physical therapy.  Notes indicate contact-guard assist for bed mobility and ambulation of 120 feet with front wheel walker.  Extremities:   Wearing SCD; no calf tenderness or swelling      Assessment/Plan     ASSESSMENT:      Chronic right hip pain    DDD (degenerative disc disease), lumbar    Lumbar radiculopathy    Spinal stenosis of lumbar region      PLAN: Postop day 1 from 1 level lumbar revision discectomy and fusion.  Doing quite well.  Very pleased with leg pain resolution.  Will DC his PCA and see how he  "does with oral medicines.  Will give him Dilaudid for breakthrough if needed. ESTEFANIA siddiqui. Otherwise, continue activity and hopefully discharge home tomorrow if doing well.  LACHO output high so we will leave drain for today and evaluate in the morning.    I discussed the patients findings and my recommendations with patient, family and nursing staff       LOS: 1 day       Gabi Barajas, APRN  12/18/2019  10:34 AM    \"Dictated utilizing Dragon dictation\".      "

## 2019-12-18 NOTE — PLAN OF CARE
FC removed; Dilaudid PCA d/c'ed; IVF's d/c'ed, ambulated in ramirez and in room; taking hydrocodone and flexeril for pain; lumbar dressing C/D/I; wife has been at bedside throughout shift; LACHO drain to left back; patient is hopeful that he will get to go home tomorrow; RN to continue to monitor.

## 2019-12-18 NOTE — THERAPY EVALUATION
Patient Name: Duane Hemphill  : 1955    MRN: [id number]                              Today's Date: 2019       Admit Date: 2019    Visit Dx: No diagnosis found.  Patient Active Problem List   Diagnosis   • Fracture, proximal femur (CMS/HCC)   • Hypertension   • Chronic right hip pain   • DDD (degenerative disc disease), lumbar   • Spinal stenosis of lumbar region with neurogenic claudication   • Lumbar radiculopathy   • Spondylolisthesis at L4-L5 level   • Spinal stenosis of lumbar region     Past Medical History:   Diagnosis Date   • Chronic low back pain    • Fracture, femur (CMS/HCC)     HISTORY OF WITH HARDWARE   • Hearing loss    • Hypertension     AT TIMES. NEVER ON ANY MEDS   • Nerve compression    • Sciatic leg pain     PAIN RADIATING DOWN RIGHT LEG     Past Surgical History:   Procedure Laterality Date   • APPENDECTOMY     • CHOLECYSTECTOMY     • FEMUR FRACTURE SURGERY Right     WITH HARDWARE   • LUMBAR LAMINECTOMY DISCECTOMY DECOMPRESSION Right 10/4/2016    Procedure: RIGHT L4-5 LUMBAR DECOMPRESSION ;  Surgeon: Saurav Rasheed MD;  Location: [facility] OR;  Service:      General Information     Row Name 19 0937          PT Evaluation Time/Intention    Document Type  evaluation  (Pended)   -MD     Mode of Treatment  physical therapy  (Pended)   -MD     Row Name 19 0937          General Information    Patient Profile Reviewed?  yes  (Pended)   -MD     Prior Level of Function  independent:;all household mobility  (Pended)   -MD     Existing Precautions/Restrictions  spinal;fall  (Pended)   -MD     Barriers to Rehab  none identified  (Pended)   -MD     Row Name 19 0937          Relationship/Environment    Lives With  spouse  (Pended)   -MD     Row Name 19 0937          Resource/Environmental Concerns    Current Living Arrangements  home/apartment/condo  (Pended)   -MD     Row Name 19 0937          Stairs Within Home, Primary    Number of Stairs, Within Home,  Primary  other (see comments);none  (Pended)  ramp  -MD     Row Name 12/18/19 0937          Cognitive Assessment/Intervention- PT/OT    Orientation Status (Cognition)  oriented x 3  (Pended)   -MD     Row Name 12/18/19 0937          Safety Issues, Functional Mobility    Impairments Affecting Function (Mobility)  balance;coordination;endurance/activity tolerance;motor control;pain;postural/trunk control;range of motion (ROM);strength  (Pended)   -MD       User Key  (r) = Recorded By, (t) = Taken By, (c) = Cosigned By    Initials Name Provider Type    Saturnino Pitts, PT Student PT Student        Mobility     Row Name 12/18/19 0939          Bed Mobility Assessment/Treatment    Bed Mobility Assessment/Treatment  supine-sit  (Pended)   -MD     Supine-Sit [place] (Bed Mobility)  contact guard;verbal cues  (Pended)   -MD     Assistive Device (Bed Mobility)  bed rails;head of bed elevated  (Pended)   -MD     Row Name 12/18/19 0939          Sit-Stand Transfer    Sit-Stand [place] (Transfers)  minimum assist (75% patient effort);1 person to manage equipment;verbal cues  (Pended)   -MD     Assistive Device (Sit-Stand Transfers)  walker, front-wheeled  (Pended)   -MD     Row Name 12/18/19 0939          Gait/Stairs Assessment/Training    [place] Level (Gait)  contact guard;1 person to manage equipment;verbal cues  (Pended)   -MD     Assistive Device (Gait)  walker, front-wheeled  (Pended)   -MD     Distance in Feet (Gait)  120  (Pended)   -MD     Deviations/Abnormal Patterns (Gait)  gait speed decreased;stride length decreased  (Pended)   -MD     Bilateral Gait Deviations  forward flexed posture;heel strike decreased  (Pended)   -MD       User Key  (r) = Recorded By, (t) = Taken By, (c) = Cosigned By    Initials Name Provider Type    Saturnino Pitts, PT Student PT Student        Obj/Interventions     Row Name 12/18/19 0914          General ROM    GENERAL ROM COMMENTS  B LE WFL  (Pended)   -MD     Row Name  12/18/19 0961          MMT (Manual Muscle Testing)    General MMT Comments  B LE >=3/5  (Pended)   -MD       User Key  (r) = Recorded By, (t) = Taken By, (c) = Cosigned By    Initials Name Provider Type    Saturnino Pitts, PT Student PT Student        Goals/Plan     Row Name 12/18/19 0992          Bed Mobility Goal 1 (PT)    Activity/Assistive Device (Bed Mobility Goal 1, PT)  bed mobility activities, all  (Pended)   -MD     [place] Level/Cues Needed (Bed Mobility Goal 1, PT)  conditional independence  (Pended)   -MD     Time Frame (Bed Mobility Goal 1, PT)  1 week  (Pended)   -MD     Row Name 12/18/19 0943          Transfer Goal 1 (PT)    Activity/Assistive Device (Transfer Goal 1, PT)  sit-to-stand/stand-to-sit;walker, rolling  (Pended)   -MD     [place] Level/Cues Needed (Transfer Goal 1, PT)  supervision required  (Pended)   -MD     Time Frame (Transfer Goal 1, PT)  1 week  (Pended)   -MD     Row Name 12/18/19 0963          Gait Training Goal 1 (PT)    Activity/Assistive Device (Gait Training Goal 1, PT)  walker, rolling  (Pended)   -MD     [place] Level (Gait Training Goal 1, PT)  supervision required  (Pended)   -MD     Distance (Gait Goal 1, PT)  400  (Pended)   -MD     Time Frame (Gait Training Goal 1, PT)  1 week  (Pended)   -MD       User Key  (r) = Recorded By, (t) = Taken By, (c) = Cosigned By    Initials Name Provider Type    Saturnino Pitts, PT Student PT Student        Clinical Impression     Row Name 12/18/19 0937          Pain Assessment    Additional Documentation  Pain Scale: Numbers Pre/Post-Treatment (Group)  (Pended)   -MD     Row Name 12/18/19 0962          Pain Scale: Numbers Pre/Post-Treatment    Pain Scale: Numbers, Pretreatment  5/10  (Pended)   -MD     Pain Scale: Numbers, Post-Treatment  5/10  (Pended)   -MD     Pain Location - Orientation  lower  (Pended)   -MD     Pain Location  back  (Pended)   -MD     Pain Intervention(s)  Ambulation/increased activity;Medication  (See MAR);Repositioned  (Pended)   -MD     Row Name 12/18/19 0944          Plan of Care Review    Plan of Care Reviewed With  patient;spouse  (Pended)   -MD     Progress  improving  (Pended)   -MD     Outcome Summary  Pt is a 64 YOM who presents s/p L4/5 decompression with posterior lumbar interbody fusion (PLIF) d/t lumbar radiculopathy. PTA, pt lived at home with spouse and was independent with all household mobility. Today, pt required Kirk for STS and CGA for bed mobility and ambulation of 120ft with FWW. During ambulation, pt took multiple rest breaks of ~15s. Recommend continued PT to improve muscular strength and endurance, decrease pain, and improve independence.  (Pended)   -MD     Row Name 12/18/19 0944          Physical Therapy Clinical Impression    Patient/Family Goals Statement (PT Clinical Impression)  Pt wants to return home with spouse  (Pended)   -MD     Criteria for Skilled Interventions Met (PT Clinical Impression)  yes  (Pended)   -MD     Rehab Potential (PT Clinical Summary)  good, to achieve stated therapy goals  (Pended)   -MD     Row Name 12/18/19 0944          Vital Signs    O2 Delivery Pre Treatment  supplemental O2  (Pended)   -MD     O2 Delivery Intra Treatment  room air  (Pended)   -MD     Post SpO2 (%)  96  (Pended)   -MD     O2 Delivery Post Treatment  room air  (Pended)   -MD     Pre Patient Position  Supine  (Pended)   -MD     Intra Patient Position  Standing  (Pended)   -MD     Post Patient Position  Sitting  (Pended)   -MD     Recovery Time  15  (Pended)   -MD     Rest Breaks   3  (Pended)   -MD     Row Name 12/18/19 0944          Positioning and Restraints    Pre-Treatment Position  in bed  (Pended)   -MD     Post Treatment Position  chair  (Pended)   -MD     In Chair  sitting;call light within reach;encouraged to call for assist;with family/caregiver  (Pended)   -MD       User Key  (r) = Recorded By, (t) = Taken By, (c) = Cosigned By    Initials Name Provider Type    MD Case  Saturnino, PT Student PT Student        Outcome Measures     Row Name 12/18/19 0953          How much help from another person do you currently need...    Turning from your back to your side while in flat bed without using bedrails?  3  (Pended)   -MD     Moving from lying on back to sitting on the side of a flat bed without bedrails?  3  (Pended)   -MD     Moving to and from a bed to a chair (including a wheelchair)?  3  (Pended)   -MD     Standing up from a chair using your arms (e.g., wheelchair, bedside chair)?  3  (Pended)   -MD     Climbing 3-5 steps with a railing?  2  (Pended)   -MD     To walk in hospital room?  3  (Pended)   -MD     AM-PAC 6 Clicks Score (PT)  17  (Pended)   -MD     Row Name 12/18/19 0953          Functional Assessment    Outcome Measure Options  AM-PAC 6 Clicks Basic Mobility (PT)  (Pended)   -MD       User Key  (r) = Recorded By, (t) = Taken By, (c) = Cosigned By    Initials Name Provider Type    Saturnino Pitts, PT Student PT Student          PT Recommendation and Plan     Plan of Care Reviewed With: (P) patient, spouse  Progress: (P) improving  Outcome Summary: (P) Pt is a 64 YOM who presents s/p L4/5 decompression with posterior lumbar interbody fusion (PLIF) d/t lumbar radiculopathy. PTA, pt lived at home with spouse and was independent with all household mobility. Today, pt required Kirk for STS and CGA for bed mobility and ambulation of 120ft with FWW. During ambulation, pt took multiple rest breaks of ~15s. Recommend continued PT to improve muscular strength and endurance, decrease pain, and improve independence.     Time Calculation:   PT Charges     Row Name 12/18/19 0954             Time Calculation    Start Time  0915  (Pended)   -MD      Stop Time  0935  (Pended)   -MD      Time Calculation (min)  20 min  (Pended)   -MD      PT Received On  12/18/19  (Pended)   -MD      PT - Next Appointment  12/19/19  (Pended)   -MD      PT Goal Re-Cert Due Date  12/25/19  (Pended)   -MD          Time Calculation- PT    Total Timed Code Minutes- PT  10 minute(s)  (Pended)   -MD        User Key  (r) = Recorded By, (t) = Taken By, (c) = Cosigned By    Initials Name Provider Type    Saturnino Pitts, PT Student PT Student        Therapy Charges for Today     Code Description Service Date Service Provider Modifiers Qty    76952057906  PT THERAPEUTIC ACT EA 15 MIN 12/18/2019 Saturnino Case, PT Student GP 1    48679825549 HC PT THER SUPP EA 15 MIN 12/18/2019 Saturnino Case, PT Student GP 1    [id number]  PT EVAL MOD COMPLEXITY 2 12/18/2019 Saturnino Case, PT Student GP 1          PT G-Codes  Outcome Measure Options: (P) AM-PAC 6 Clicks Basic Mobility (PT)  AM-PAC 6 Clicks Score (PT): (P) 17    Saturnino Case PT Student  12/18/2019

## 2019-12-19 VITALS
RESPIRATION RATE: 18 BRPM | DIASTOLIC BLOOD PRESSURE: 99 MMHG | TEMPERATURE: 98.1 F | WEIGHT: 181 LBS | BODY MASS INDEX: 26.81 KG/M2 | OXYGEN SATURATION: 94 % | SYSTOLIC BLOOD PRESSURE: 152 MMHG | HEART RATE: 82 BPM | HEIGHT: 69 IN

## 2019-12-19 PROCEDURE — 99024 POSTOP FOLLOW-UP VISIT: CPT | Performed by: NURSE PRACTITIONER

## 2019-12-19 PROCEDURE — 97530 THERAPEUTIC ACTIVITIES: CPT

## 2019-12-19 RX ORDER — SIMETHICONE 80 MG
80 TABLET,CHEWABLE ORAL 4 TIMES DAILY PRN
Status: DISCONTINUED | OUTPATIENT
Start: 2019-12-19 | End: 2019-12-19 | Stop reason: HOSPADM

## 2019-12-19 RX ORDER — BISACODYL 10 MG
10 SUPPOSITORY, RECTAL RECTAL DAILY PRN
COMMUNITY
Start: 2019-12-19 | End: 2023-03-15

## 2019-12-19 RX ORDER — SIMETHICONE 80 MG
80 TABLET,CHEWABLE ORAL 4 TIMES DAILY PRN
COMMUNITY
Start: 2019-12-19 | End: 2023-03-15

## 2019-12-19 RX ORDER — BISACODYL 10 MG
10 SUPPOSITORY, RECTAL RECTAL DAILY
Status: DISCONTINUED | OUTPATIENT
Start: 2019-12-19 | End: 2019-12-19

## 2019-12-19 RX ORDER — HYDROCODONE BITARTRATE AND ACETAMINOPHEN 7.5; 325 MG/1; MG/1
1 TABLET ORAL EVERY 4 HOURS PRN
Qty: 35 TABLET | Refills: 0 | Status: SHIPPED | OUTPATIENT
Start: 2019-12-19 | End: 2019-12-27

## 2019-12-19 RX ORDER — POLYETHYLENE GLYCOL 3350 17 G/17G
17 POWDER, FOR SOLUTION ORAL DAILY
COMMUNITY
Start: 2019-12-19 | End: 2023-03-15

## 2019-12-19 RX ORDER — ACETAMINOPHEN 325 MG/1
650 TABLET ORAL EVERY 4 HOURS PRN
COMMUNITY
Start: 2019-12-19 | End: 2023-03-15

## 2019-12-19 RX ORDER — BISACODYL 10 MG
10 SUPPOSITORY, RECTAL RECTAL DAILY PRN
Status: DISCONTINUED | OUTPATIENT
Start: 2019-12-19 | End: 2019-12-19 | Stop reason: HOSPADM

## 2019-12-19 RX ORDER — PSEUDOEPHEDRINE HCL 30 MG
100 TABLET ORAL DAILY
Qty: 30 EACH | Refills: 0 | Status: SHIPPED | OUTPATIENT
Start: 2019-12-20 | End: 2023-03-15

## 2019-12-19 RX ORDER — POLYETHYLENE GLYCOL 3350 17 G/17G
17 POWDER, FOR SOLUTION ORAL DAILY
Status: DISCONTINUED | OUTPATIENT
Start: 2019-12-19 | End: 2019-12-19 | Stop reason: HOSPADM

## 2019-12-19 RX ORDER — SENNA AND DOCUSATE SODIUM 50; 8.6 MG/1; MG/1
2 TABLET, FILM COATED ORAL NIGHTLY
Qty: 30 TABLET | Refills: 0 | Status: SHIPPED | OUTPATIENT
Start: 2019-12-19 | End: 2023-03-15

## 2019-12-19 RX ORDER — CYCLOBENZAPRINE HCL 10 MG
10 TABLET ORAL 3 TIMES DAILY PRN
Qty: 30 TABLET | Refills: 1 | Status: SHIPPED | OUTPATIENT
Start: 2019-12-19 | End: 2023-03-15

## 2019-12-19 RX ORDER — BISACODYL 10 MG
10 SUPPOSITORY, RECTAL RECTAL DAILY
Status: DISCONTINUED | OUTPATIENT
Start: 2019-12-19 | End: 2019-12-19 | Stop reason: HOSPADM

## 2019-12-19 RX ORDER — MULTIVIT-MIN/FA/LYCOPEN/LUTEIN .4-300-25
1 TABLET ORAL DAILY
Start: 2019-12-26 | End: 2023-03-15

## 2019-12-19 RX ADMIN — DOCUSATE SODIUM 100 MG: 100 CAPSULE, LIQUID FILLED ORAL at 08:42

## 2019-12-19 RX ADMIN — CYCLOBENZAPRINE 10 MG: 10 TABLET, FILM COATED ORAL at 08:42

## 2019-12-19 RX ADMIN — GABAPENTIN 100 MG: 100 CAPSULE ORAL at 08:42

## 2019-12-19 RX ADMIN — ACETAMINOPHEN 650 MG: 325 TABLET, FILM COATED ORAL at 08:42

## 2019-12-19 RX ADMIN — SODIUM CHLORIDE, PRESERVATIVE FREE 3 ML: 5 INJECTION INTRAVENOUS at 08:42

## 2019-12-19 RX ADMIN — CYCLOBENZAPRINE 10 MG: 10 TABLET, FILM COATED ORAL at 01:26

## 2019-12-19 NOTE — DISCHARGE INSTR - ACTIVITY
Dressing from drain site can be removed in 24-48 hours; call the MD with excessive drainage or bleeding; keep covered with gauze/tape daily as needed until drain site is closed.    Do not remove steri-strips from incision; they will fall off on their own.

## 2019-12-19 NOTE — DISCHARGE SUMMARY
Duane Hemphill  1955    Patient Care Team:  Flex Ellison MD as PCP - General (Internal Medicine)  Handy Freeman MD as Surgeon (Orthopedic Surgery)  Saurav Rasheed MD as Surgeon (Neurosurgery)    Date of Admit: 12/17/2019    Date of Discharge:  12/19/2019    Discharge Diagnosis:  Chronic right hip pain    DDD (degenerative disc disease), lumbar    Lumbar radiculopathy    Spinal stenosis of lumbar region      Procedures Performed  Procedure(s):  LUMBAR 4 TO LUMBAR 5 OPEN DECOMPRESSION AND POSTERIOR LUMBAR INTERBODY FUSION WITH CAGE AND SEXTANT PEDICLE SCREW FIXATION       Complications: none    Consultants:   Consults     No orders found from 11/18/2019 to 12/18/2019.          Condition on Discharge: stable    Discharge disposition: home    Pertinent Test Results: none    Brief HPI: Patient evaluated in office for complaints of low back and right buttock and leg pain initially but progressed to having left buttock and leg pain. Imaging revealed degenerative spondylolisthesis and some spinal stenosis at L4-L5, actually worse on the left, right but with root compression. RBAs of treatment were discussed including the above procedure. Patient consented to above procedure.    Hospital Course: Patient admitted for above procedure. The procedure itself was without complication. The patient was transferred to [facility] following recovery.  He was significantly improved on postoperative day 1.  He is continued to do well.  Drain output reduced and thinned overnight.  This will be removed prior to discharge.  He is voiding, ambulating, tolerating diet.  He is passing flatus but has not had a bowel movement.  His PCA was DC'd postoperative day 1 as he was not using much and he stated the oral medicines were helping more.  His pain is controlled with oral medicines.  I have advised him of some bowel regimen to use at home but most importantly to keep active and drink plenty of water.  He has been given both verbal and  written postoperative instructions today.  He will keep this follow-up appointment as scheduled.    Discharge Physical Exam:    Temp:  [98 °F (36.7 °C)-98.9 °F (37.2 °C)] 98.1 °F (36.7 °C)  Heart Rate:  [77-92] 82  Resp:  [16-22] 18  BP: (125-152)/(70-99) 152/99    Current labs:  Lab Results (last 24 hours)     ** No results found for the last 24 hours. **            General Appearance No acute distress   Neurological Awake, Alert, and oriented x 3   Motor Strength normal, tone normal   Sensory Intact to light touch bilateral lower extremities   Gait and station Ambulating contact-guard assist with front wheeled walker   Back  multiple lumbar incisions well approximated with no redness drainage or swelling.  LACHO to bulb suction with serosanguineous output today.  Will be removed prior to discharge.   Extremities Moves all extremities well, no edema, no cyanosis, no redness         Discharge Medications  Rodo has been reviewed and narcotic consent is on file in the patient's chart.     Your medication list      START taking these medications      Instructions Last Dose Given Next Dose Due   acetaminophen 325 MG tablet  Commonly known as:  TYLENOL      Take 2 tablets by mouth Every 4 (Four) Hours As Needed for Mild Pain .       bisacodyl 10 MG suppository  Commonly known as:  DULCOLAX      Insert 1 suppository into the rectum Daily As Needed for Constipation.       cyclobenzaprine 10 MG tablet  Commonly known as:  FLEXERIL      Take 1 tablet by mouth 3 (Three) Times a Day As Needed for Muscle Spasms.       docusate sodium 100 MG capsule  Start taking on:  December 20, 2019      Take 100 mg by mouth Daily.       HYDROcodone-acetaminophen 7.5-325 MG per tablet  Commonly known as:  NORCO      Take 1 tablet by mouth Every 4 (Four) Hours As Needed for Moderate Pain  for up to 8 days.       polyethylene glycol packet  Commonly known as:  MIRALAX      Take 17 g by mouth Daily.       senna-docusate sodium 8.6-50 MG  tablet  Commonly known as:  SENOKOT-S      Take 2 tablets by mouth Every Night.       simethicone 80 MG chewable tablet  Commonly known as:  MYLICON      Chew 1 tablet 4 (Four) Times a Day As Needed for Flatulence.          CHANGE how you take these medications      Instructions Last Dose Given Next Dose Due   CENTRUM SILVER ADULT 50+ tablet  Start taking on:  December 26, 2019  What changed:  These instructions start on December 26, 2019. If you are unsure what to do until then, ask your doctor or other care provider.      Take 1 tablet by mouth Daily.          CONTINUE taking these medications      Instructions Last Dose Given Next Dose Due   gabapentin 100 MG capsule  Commonly known as:  NEURONTIN      Take 100 mg by mouth 3 (Three) Times a Day.          STOP taking these medications    Chlorhexidine Gluconate 2 % pads              Where to Get Your Medications      These medications were sent to [facility] [place] [id number]    Hours:  7:00AM-6PM Mon-Fri Phone:  [phone]   · cyclobenzaprine 10 MG tablet  · docusate sodium 100 MG capsule  · HYDROcodone-acetaminophen 7.5-325 MG per tablet  · senna-docusate sodium 8.6-50 MG tablet     You can get these medications from any pharmacy    You don't need a prescription for these medications  · acetaminophen 325 MG tablet  · bisacodyl 10 MG suppository  · polyethylene glycol packet  · simethicone 80 MG chewable tablet     Information about where to get these medications is not yet available    Ask your nurse or doctor about these medications  · CENTRUM SILVER ADULT 50+ tablet         Discharge Diet:   Diet Instructions     Diet:      Diet Texture / Consistency:  Regular    Advance as tolerated        Resume home diet                Diet Order   Procedures   • Diet Regular       Activity at Discharge:   Activity Instructions     Discharge Activity      Saurav Rasheed M.D., F.A.C.S    Lumbar Fusion with  Instrumentation  Post-Operative Instructions    You should have a post-operative visit scheduled with the Physician Assistant or Nurse Practitioner for approximately 2 to 2 ½ weeks post-operatively.  If you do not have one, call the office when you return home to schedule this visit.  You should be off work at least until the first visit.    Do not lift anything over five (5) pounds.  No bending, twisting, or squatting should be done until the first visit.  However, walking is allowed and encouraged.  Walking should be done on a flat surface.  The speed and distance should be chosen according to your comfort level and stamina.  In general, short frequent walks are preferred.  Climbing stairs is allowed but should be minimized.  In general, activity restrictions will be lessened following the first visit.      Sitting, while allowed, should be kept to a minimum until the first visit because it may increase your discomfort.  No more than 15 to 20 minutes three times a day should be done, enough time to eat your meals and use the bathroom.  Otherwise, if you are not walking or standing, you should be lying down on your back or side.  A reclining chair is acceptable but only in the reclined position.      If you are given a back brace in the hospital, this brace is to be worn at all times when you are up or sitting.  It is not necessary to wear the brace while lying in bed.    A bone stimulator device may be ordered for you.  If you are given one, please wear as directed.    Smoking is not recommended, as it interferes with the fusion process.    Please do not drive until the first visit, although you may be driven.    Refrain from any sexual activity until after your first visit with the Physician Assistant or Nurse Practitioner.    Take your dressings off and leave them off after the first day home.  You may get the incisions wet during showering and pat them dry, but do not soak the incisions or rub them vigorously  with a towel.  No tub baths or hot tubs allowed.  The incision should be cleaned three times daily with hydrogen peroxide unless otherwise directed by the nurse or physician.    A prescription for pain medications will be provided at discharge.  This medication is primarily for any pain related to the incisions and should be used only on an as-needed basis.  Sometimes, antibiotics and medication for spasms are also given.  Do not take anti-inflammatory medications such as Ibuprofen, Aspirin, Motrin or Advil.  These medications may interfere with the fusion process of your surgery.  Take medications only as directed by the physician. If a refill of your pain medication is required, due to changes in federal law, in order to have this medication refilled you must contact the office four days prior to the due date and make arrangements to pick-up the prescription in our office. The prescription refill cannot be called in to the pharmacy. Your prescription will be ready for pick-up the day the refill is due.         If there are any problems, such as excessive back or leg pain, persistent temperature of 100 degrees or greater despite Tylenol, chills, excessive bloody discharge from the wound, redness and hot skin around the incision, clear or yellowish discharge from the wound, or severe headaches, particularly when sitting or standing, please call the office.  A small amount of bleeding from the incision during the first few days is not unusual.    We look forward to seeing you again in follow-up.  Do no hesitate to call if any questions or concerns arise regarding your surgery.  We would rather you communicate with us regarding such issues early.    Thank You.    Pelvic Rest            Call for: questions or concerns    Follow-up Appointments  Future Appointments   Date Time Provider Department Center   1/3/2020  9:15 AM Sherry Valentin APRN [facility] None     Follow-up Information     Flex Ellison MD .   "  Specialty:  Internal Medicine  Contact information:  [address]  [facility] [address]  [facility] [address] [phone]                 Additional Instructions for the Follow-ups that You Need to Schedule     Notify Physician or Go To The ED For the Following Conditions   As directed      Including but not limited to fever >100.5, chills, wound concerns (redness, swelling, drainage), new symptoms of numbness, tingling, weakness; new or uncontrolled pain despite using prescribed medications    Order Comments:  Including but not limited to fever >100.5, chills, wound concerns (redness, swelling, drainage), new symptoms of numbness, tingling, weakness; new or uncontrolled pain despite using prescribed medications                Test Results Pending at Discharge     None    I discussed the discharge instructions with patient and family    ANSON Beebe  12/19/19  12:11 PM    25 min spent in reviewing records, discussion and examination of the patient and discussion with other members of the patient's medical team.    \"Dictated utilizing Dragon dictation\".      "

## 2019-12-19 NOTE — PLAN OF CARE
Problem: Patient Care Overview  Goal: Plan of Care Review  Outcome: Ongoing (interventions implemented as appropriate)  Flowsheets  Taken 12/18/2019 6759  Plan of Care Reviewed With: patient;spouse  Taken 12/19/2019 2273  Outcome Summary: Pt A+Ox4, VSS. Dressing on back clear, dry, and intact. Wife at bedside, attentive to pt and his needs. Pt has had minimal complaints of pain, Flexeril was only given this shift. Pt has gotten up standby assit and walker to use restroom and walk around the entire unit (400 ft). Pt tolerating well. Pt tried to have a bowel movement, but no success. LACHO drain with low output, 40 ml so far for total shift. Pt refused ice pack and SCDs. Pt is eager to go home. Will continue to monitor and progress towards goals as tolerated.  Goal: Individualization and Mutuality  Outcome: Ongoing (interventions implemented as appropriate)  Goal: Discharge Needs Assessment  Outcome: Ongoing (interventions implemented as appropriate)  Goal: Interprofessional Rounds/Family Conf  Outcome: Ongoing (interventions implemented as appropriate)     Problem: Surgery Nonspecified (Adult)  Goal: Signs and Symptoms of Listed Potential Problems Will be Absent, Minimized or Managed (Surgery Nonspecified)  Outcome: Ongoing (interventions implemented as appropriate)  Goal: Anesthesia/Sedation Recovery  Outcome: Ongoing (interventions implemented as appropriate)     Problem: Pain, Acute (Adult)  Goal: Identify Related Risk Factors and Signs and Symptoms  Outcome: Ongoing (interventions implemented as appropriate)  Goal: Acceptable Pain Control/Comfort Level  Outcome: Ongoing (interventions implemented as appropriate)     Problem: Pain, Chronic (Adult)  Goal: Identify Related Risk Factors and Signs and Symptoms  Outcome: Ongoing (interventions implemented as appropriate)  Goal: Acceptable Pain/Comfort Level and Functional Ability  Outcome: Ongoing (interventions implemented as appropriate)     Problem: Fall Risk  (Adult)  Goal: Identify Related Risk Factors and Signs and Symptoms  Outcome: Ongoing (interventions implemented as appropriate)  Goal: Absence of Fall  Outcome: Ongoing (interventions implemented as appropriate)

## 2019-12-19 NOTE — THERAPY TREATMENT NOTE
Patient Name: Duane Hemphill  : 1955    MRN: [id number]                              Today's Date: 2019       Admit Date: 2019    Visit Dx: No diagnosis found.  Patient Active Problem List   Diagnosis   • Fracture, proximal femur (CMS/HCC)   • Hypertension   • Chronic right hip pain   • DDD (degenerative disc disease), lumbar   • Spinal stenosis of lumbar region with neurogenic claudication   • Lumbar radiculopathy   • Spondylolisthesis at L4-L5 level   • Spinal stenosis of lumbar region     Past Medical History:   Diagnosis Date   • Chronic low back pain    • Fracture, femur (CMS/HCC)     HISTORY OF WITH HARDWARE   • Hearing loss    • Hypertension     AT TIMES. NEVER ON ANY MEDS   • Nerve compression    • Sciatic leg pain     PAIN RADIATING DOWN RIGHT LEG     Past Surgical History:   Procedure Laterality Date   • APPENDECTOMY     • CHOLECYSTECTOMY     • FEMUR FRACTURE SURGERY Right     WITH HARDWARE   • LUMBAR DISCECTOMY FUSION INSTRUMENTATION Bilateral 2019    Procedure: LUMBAR 4 TO LUMBAR 5 OPEN DECOMPRESSION AND POSTERIOR LUMBAR INTERBODY FUSION WITH CAGE AND SEXTANT PEDICLE SCREW FIXATION;  Surgeon: Saurav Rasheed MD;  Location: [facility]  Service: Neurosurgery   • LUMBAR LAMINECTOMY DISCECTOMY DECOMPRESSION Right 10/4/2016    Procedure: RIGHT L4-5 LUMBAR DECOMPRESSION ;  Surgeon: Saurav Rasheed MD;  Location: [facility]  Service:      General Information     Row Name 19 1149          PT Evaluation Time/Intention    Document Type  therapy note (daily note)  -AE     Mode of Treatment  physical therapy  -AE     Row Name 19 1145          General Information    Patient Profile Reviewed?  yes  -AE       User Key  (r) = Recorded By, (t) = Taken By, (c) = Cosigned By    Initials Name Provider Type    AE Maggie Mac, PT Physical Therapist        Mobility     Row Name 19 5461          Bed Mobility Assessment/Treatment    Comment (Bed Mobility)  up in  chair  -AE     Row Name 12/19/19 1150          Transfer Assessment/Treatment    Comment (Transfers)  supervision for all transfers  -AE     Row Name 12/19/19 1150          Bed-Chair Transfer    Bed-Chair [place] (Transfers)  supervision  -AE     Row Name 12/19/19 1150          Sit-Stand Transfer    Sit-Stand [place] (Transfers)  supervision  -AE     Assistive Device (Sit-Stand Transfers)  walker, front-wheeled  -AE     Row Name 12/19/19 1150          Gait/Stairs Assessment/Training    Gait/Stairs Assessment/Training  gait/ambulation assistive device;gait/ambulation independence  -AE     [place] Level (Gait)  supervision  -AE     Assistive Device (Gait)  walker, front-wheeled  -AE     Distance in Feet (Gait)  250 ft  -AE       User Key  (r) = Recorded By, (t) = Taken By, (c) = Cosigned By    Initials Name Provider Type    AE Maggie Mac PT Physical Therapist        Obj/Interventions     Row Name 12/19/19 1150          Therapeutic Exercise    Comment (Therapeutic Exercise)  standing marches x 10  -AE     Row Name 12/19/19 1150          Dynamic Standing Balance    Comment, Reaches Outside Midline (Standing, Dynamic Balance)  no LOB observed with eyes closed, narrow CASSIDY, modified tandem  -AE       User Key  (r) = Recorded By, (t) = Taken By, (c) = Cosigned By    Initials Name Provider Type    AE Maggie Mac, SOHEILA Physical Therapist        Goals/Plan    No documentation.       Clinical Impression     Row Name 12/19/19 1151          Pain Assessment    Additional Documentation  Pain Scale: Numbers Pre/Post-Treatment (Group)  -AE     Row Name 12/19/19 1151          Pain Scale: Numbers Pre/Post-Treatment    Pain Scale: Numbers, Pretreatment  2/10  -AE     Pain Scale: Numbers, Post-Treatment  2/10  -AE     Pain Location - Orientation  incisional  -AE     Pain Location  back  -AE     Pain Intervention(s)  Medication (See MAR);Repositioned;Rest  -AE     Row Name 12/19/19 1151          Positioning and  Restraints    Pre-Treatment Position  sitting in chair/recliner  -AE     Post Treatment Position  chair  -AE     In Chair  sitting;call light within reach;encouraged to call for assist;with other staff  -AE       User Key  (r) = Recorded By, (t) = Taken By, (c) = Cosigned By    Initials Name Provider Type    Maggie Jensen PT Physical Therapist        Outcome Measures     Row Name 12/19/19 1151          How much help from another person do you currently need...    Turning from your back to your side while in flat bed without using bedrails?  3  -AE     Moving from lying on back to sitting on the side of a flat bed without bedrails?  3  -AE     Moving to and from a bed to a chair (including a wheelchair)?  4  -AE     Standing up from a chair using your arms (e.g., wheelchair, bedside chair)?  4  -AE     Climbing 3-5 steps with a railing?  3  -AE     To walk in hospital room?  4  -AE     AM-PAC 6 Clicks Score (PT)  21  -AE       User Key  (r) = Recorded By, (t) = Taken By, (c) = Cosigned By    Initials Name Provider Type    Maggie Jensen PT Physical Therapist          PT Recommendation and Plan     Outcome Summary/Treatment Plan (PT)  Anticipated Discharge Disposition (PT): home     Time Calculation:   PT Charges     Row Name 12/19/19 1153             Time Calculation    Start Time  1142  -AE      Stop Time  1152  -AE      Time Calculation (min)  10 min  -AE      PT Received On  12/19/19  -AE      PT - Next Appointment  12/20/19  -AE         Time Calculation- PT    Total Timed Code Minutes- PT  10 minute(s)  -AE        User Key  (r) = Recorded By, (t) = Taken By, (c) = Cosigned By    Initials Name Provider Type    Maggie Jensen PT Physical Therapist        Therapy Charges for Today     Code Description Service Date Service Provider Modifiers Qty    [id number]  PT THERAPEUTIC ACT EA 15 MIN 12/19/2019 Maggie Mac PT GP 1          PT G-Codes  Outcome Measure Options: AM-PAC 6 Clicks Basic  Mobility (PT)  AM-PAC 6 Clicks Score (PT): 21    Maggie Mac, SOHEILA  12/19/2019

## 2019-12-19 NOTE — PLAN OF CARE
Patient will be dc home to care of wife and to follow up with MD's as prescribed. AVS reviewed with patient and wife.

## 2019-12-19 NOTE — PLAN OF CARE
Problem: Patient Care Overview  Goal: Plan of Care Review  Outcome: Ongoing (interventions implemented as appropriate)  Note:   Pt reported he had completed walk around nursing station with spouse, FWW, and supervision. Emphasized balance and standing therex including narrow CASSIDY, eyes closed, modified tandem, and SL marches with SBA and no LOB. Pt expected to DC home today.

## 2019-12-20 ENCOUNTER — TELEPHONE (OUTPATIENT)
Dept: NEUROSURGERY | Facility: CLINIC | Age: 64
End: 2019-12-20

## 2019-12-20 NOTE — TELEPHONE ENCOUNTER
Pt had SX on 12/17/19 with DR GOYAL.  He was told that a suppository for constipation would be called in for him.  He has not had a BM since Monday.  Call to [facility] and then let wife know so she can p/u from janiya Wise  [phone]

## 2019-12-20 NOTE — TELEPHONE ENCOUNTER
Per Dr GOYAL, he just needs to use an OTC suppository.    Spoke with patient's wife Billie and informed her, she voiced understanding

## 2020-01-03 ENCOUNTER — OFFICE VISIT (OUTPATIENT)
Dept: NEUROSURGERY | Facility: CLINIC | Age: 65
End: 2020-01-03

## 2020-01-03 VITALS
HEART RATE: 79 BPM | DIASTOLIC BLOOD PRESSURE: 85 MMHG | SYSTOLIC BLOOD PRESSURE: 142 MMHG | BODY MASS INDEX: 26.73 KG/M2 | HEIGHT: 69 IN

## 2020-01-03 DIAGNOSIS — Z09 SURGICAL FOLLOWUP VISIT: Primary | ICD-10-CM

## 2020-01-03 PROCEDURE — 99024 POSTOP FOLLOW-UP VISIT: CPT | Performed by: NURSE PRACTITIONER

## 2020-02-03 ENCOUNTER — OFFICE VISIT (OUTPATIENT)
Dept: NEUROSURGERY | Facility: CLINIC | Age: 65
End: 2020-02-03

## 2020-02-03 VITALS
BODY MASS INDEX: 26.81 KG/M2 | HEART RATE: 63 BPM | HEIGHT: 69 IN | DIASTOLIC BLOOD PRESSURE: 80 MMHG | SYSTOLIC BLOOD PRESSURE: 140 MMHG | WEIGHT: 181 LBS

## 2020-02-03 DIAGNOSIS — Z09 SURGICAL FOLLOWUP VISIT: Primary | ICD-10-CM

## 2020-02-03 PROCEDURE — 99024 POSTOP FOLLOW-UP VISIT: CPT | Performed by: NURSE PRACTITIONER

## 2020-02-03 RX ORDER — AMOXICILLIN AND CLAVULANATE POTASSIUM 875; 125 MG/1; MG/1
TABLET, FILM COATED ORAL
COMMUNITY
Start: 2020-02-01 | End: 2023-03-15

## 2020-02-11 NOTE — PROGRESS NOTES
Subjective   Patient ID: Duane Hemphill is a 64 y.o. male is here today for a p/o 3 L4/5 decomp/fusion on 12/17/19. Pt reports no leg pain today, just soreness in the back.    History of Present Illness     Mr. Hemphill is here today for postoperative reevaluation.  He is now 10 weeks out from L4-5 decompression and fusion with Dr. Rasheed.  Last saw Mr. Hemphill in the office February 3, 2020.  At that time he was doing well and he was transitioned to some outpatient physical therapy.  He had some swelling at the center portion of his incision.  He reports improvement in the swelling after applying some pressure to the site with a firm lumbar pillow.  Today he reports no leg pain and no significant back pain.  He has been resuming his activities as a . He notices no problems.     The following portions of the patient's history were reviewed and updated as appropriate: allergies, current medications, past family history, past medical history, past social history, past surgical history and problem list.    Review of Systems   Constitutional: Positive for activity change (has begun to increase acitivites some with no problems).   Genitourinary: Negative for difficulty urinating and enuresis.   Musculoskeletal: Negative for back pain (soreness).   Neurological: Negative for weakness and numbness.   Psychiatric/Behavioral: Negative for sleep disturbance.   All other systems reviewed and are negative.      Objective   Physical Exam   Constitutional: He appears well-developed. No distress.   Neurological:   No motor or sensory deficits in both legs. No calf swelling or tenderness to palpation.    Skin: Skin is warm and dry.   Lumbar incision is well healed. The mild swelling at the center of the incision has resolved.     Psychiatric: He has a normal mood and affect.   Vitals reviewed.    Neurologic Exam    Assessment/Plan   I  Medical Decision Making:      Mr. Hemphill returns to the office today for postoperative  evaluation.  He is doing quite well.  He is very pleased with his progress.  It has now been 11 weeks since surgery.  He has begun to resume some of his normal activities and he is tolerating it well.    We will keep it open-ended from here.  Ms. Hemphill will call us if he experiences any worsening pain or has any questions or concerns.    Duane was seen today for post-op.    Diagnoses and all orders for this visit:    Surgical followup visit      Return if symptoms worsen or fail to improve.

## 2020-02-26 ENCOUNTER — OFFICE VISIT (OUTPATIENT)
Dept: NEUROSURGERY | Facility: CLINIC | Age: 65
End: 2020-02-26

## 2020-02-26 VITALS
SYSTOLIC BLOOD PRESSURE: 144 MMHG | WEIGHT: 183 LBS | HEIGHT: 69 IN | DIASTOLIC BLOOD PRESSURE: 84 MMHG | HEART RATE: 52 BPM | BODY MASS INDEX: 27.11 KG/M2

## 2020-02-26 DIAGNOSIS — Z09 SURGICAL FOLLOWUP VISIT: Primary | ICD-10-CM

## 2020-02-26 PROCEDURE — 99024 POSTOP FOLLOW-UP VISIT: CPT | Performed by: NURSE PRACTITIONER

## 2022-07-09 ENCOUNTER — HOSPITAL ENCOUNTER (EMERGENCY)
Dept: HOSPITAL 49 - FER | Age: 67
Discharge: OTHER INSTITUTION | End: 2022-07-09
Payer: MEDICARE

## 2022-07-09 DIAGNOSIS — I63.231: Primary | ICD-10-CM

## 2022-07-09 DIAGNOSIS — R29.700: ICD-10-CM

## 2022-07-09 DIAGNOSIS — G81.94: ICD-10-CM

## 2022-07-09 DIAGNOSIS — I10: ICD-10-CM

## 2022-07-09 DIAGNOSIS — Z20.822: ICD-10-CM

## 2022-07-09 LAB
ALBUMIN SERPL-MCNC: 4.3 G/DL (ref 3.4–5)
ALKALINE PHOSHATASE: 119 U/L (ref 46–116)
ALT SERPL-CCNC: 59 U/L (ref 16–63)
AST: 31 U/L (ref 15–37)
BASOPHIL: 1 % (ref 0–2)
BILIRUBIN - TOTAL: 0.8 MG/DL (ref 0.2–1)
BILIRUBIN: NEGATIVE MG/DL
BLOOD: NEGATIVE ERY/UL
BUN SERPL-MCNC: 17 MG/DL (ref 7–18)
BUN/CREAT RATIO (CALC): 14.7 RATIO
CHLORIDE: 99 MMOL/L (ref 98–107)
CHOLEST SERPL-MCNC: 274 MG/DL (ref ?–200)
CLARITY UR: CLEAR
CO2 (BICARBONATE): 23 MMOL/L (ref 21–32)
COLOR: YELLOW
CREATININE: 1.16 MG/DL (ref 0.67–1.17)
EOSINOPHIL: 1.9 % (ref 0–7)
GLOBULIN (CALCULATION): 3.6 G/DL
GLUCOSE (U): NORMAL MG/DL
GLUCOSE SERPL-MCNC: 110 MG/DL (ref 74–106)
HCT: 49.9 % (ref 42–52)
HDL: 36 MG/DL (ref 40–60)
HGB BLD-MCNC: 17.7 G/DL (ref 13.2–18)
INR PPP: 0.96 (ref 0.9–1.2)
LDL - DIRECT: 168 MG/DL (ref ?–100)
LEUKOCYTES: NEGATIVE LEU/UL
LYMPHOCYTE: 28.5 % (ref 15–48)
MCH RBC QN AUTO: 33.5 PG (ref 25–31)
MCHC RBC AUTO-ENTMCNC: 35.5 G/DL (ref 32–36)
MCV: 94.3 FL (ref 78–100)
MONOCYTE: 9.8 % (ref 0–12)
MPV: 10.7 FL (ref 6–9.5)
NEUTROPHIL: 58.2 % (ref 41–80)
NITRITE: NEGATIVE MG/DL
NRBC: 0
PLT: 322 K/UL (ref 150–400)
POTASSIUM: 4.3 MMOL/L (ref 3.5–5.1)
PROTEIN: NEGATIVE MG/DL
PROTHROMBIN TIME: 12.5 SECONDS (ref 11.9–13.9)
PTT: 26.3 SECONDS (ref 24.9–34.6)
RBC MORPHOLOGY: NORMAL
RBC: 5.29 M/UL (ref 4.7–6)
RDW: 12.1 % (ref 11.5–14)
SPECIFIC GRAVITY: <=1.005 (ref 1–1.03)
TOTAL PROTEIN: 7.9 G/DL (ref 6.4–8.2)
TRIGLYCERIDES: 313 MG/DL (ref ?–150)
UROBILINOGEN: 0.2 MG/DL (ref 0.2–1)
WBC: 9.3 K/UL (ref 4–10.5)

## 2022-07-09 PROCEDURE — U0002 COVID-19 LAB TEST NON-CDC: HCPCS

## 2023-03-15 ENCOUNTER — OFFICE VISIT (OUTPATIENT)
Dept: FAMILY MEDICINE CLINIC | Age: 68
End: 2023-03-15
Payer: MEDICARE

## 2023-03-15 VITALS
HEART RATE: 76 BPM | HEIGHT: 69 IN | SYSTOLIC BLOOD PRESSURE: 142 MMHG | WEIGHT: 200 LBS | DIASTOLIC BLOOD PRESSURE: 89 MMHG | OXYGEN SATURATION: 95 % | BODY MASS INDEX: 29.62 KG/M2

## 2023-03-15 DIAGNOSIS — G47.09 OTHER INSOMNIA: ICD-10-CM

## 2023-03-15 DIAGNOSIS — E78.00 HIGH CHOLESTEROL: ICD-10-CM

## 2023-03-15 DIAGNOSIS — Z12.5 SCREENING FOR PROSTATE CANCER: ICD-10-CM

## 2023-03-15 DIAGNOSIS — M54.2 ACUTE NECK PAIN: ICD-10-CM

## 2023-03-15 DIAGNOSIS — I10 ESSENTIAL HYPERTENSION: Primary | ICD-10-CM

## 2023-03-15 DIAGNOSIS — R41.3 POOR SHORT-TERM MEMORY: ICD-10-CM

## 2023-03-15 DIAGNOSIS — Z11.59 ENCOUNTER FOR SCREENING FOR OTHER VIRAL DISEASES: ICD-10-CM

## 2023-03-15 DIAGNOSIS — Z91.89 AT RISK FOR CORONARY ARTERY DISEASE: ICD-10-CM

## 2023-03-15 PROBLEM — M51.369 DDD (DEGENERATIVE DISC DISEASE), LUMBAR: Status: RESOLVED | Noted: 2017-04-28 | Resolved: 2023-03-15

## 2023-03-15 PROBLEM — M48.062 SPINAL STENOSIS OF LUMBAR REGION WITH NEUROGENIC CLAUDICATION: Status: RESOLVED | Noted: 2017-07-03 | Resolved: 2023-03-15

## 2023-03-15 PROBLEM — M51.36 DDD (DEGENERATIVE DISC DISEASE), LUMBAR: Status: RESOLVED | Noted: 2017-04-28 | Resolved: 2023-03-15

## 2023-03-15 PROBLEM — M25.551 CHRONIC RIGHT HIP PAIN: Status: RESOLVED | Noted: 2017-04-28 | Resolved: 2023-03-15

## 2023-03-15 PROBLEM — M48.061 SPINAL STENOSIS OF LUMBAR REGION: Status: RESOLVED | Noted: 2019-12-17 | Resolved: 2023-03-15

## 2023-03-15 PROBLEM — Z86.73 HISTORY OF TIA (TRANSIENT ISCHEMIC ATTACK): Status: ACTIVE | Noted: 2022-07-12

## 2023-03-15 PROBLEM — M43.16 SPONDYLOLISTHESIS AT L4-L5 LEVEL: Status: RESOLVED | Noted: 2019-12-16 | Resolved: 2023-03-15

## 2023-03-15 PROBLEM — I65.23 BILATERAL CAROTID ARTERY STENOSIS: Status: ACTIVE | Noted: 2023-03-15

## 2023-03-15 PROBLEM — M54.16 LUMBAR RADICULOPATHY: Status: RESOLVED | Noted: 2019-09-10 | Resolved: 2023-03-15

## 2023-03-15 PROBLEM — G89.29 CHRONIC RIGHT HIP PAIN: Status: RESOLVED | Noted: 2017-04-28 | Resolved: 2023-03-15

## 2023-03-15 PROBLEM — I63.9 ACUTE ISCHEMIC STROKE: Status: ACTIVE | Noted: 2022-07-12

## 2023-03-15 PROCEDURE — 99204 OFFICE O/P NEW MOD 45 MIN: CPT | Performed by: FAMILY MEDICINE

## 2023-03-15 PROCEDURE — 1160F RVW MEDS BY RX/DR IN RCRD: CPT | Performed by: FAMILY MEDICINE

## 2023-03-15 PROCEDURE — 3077F SYST BP >= 140 MM HG: CPT | Performed by: FAMILY MEDICINE

## 2023-03-15 PROCEDURE — 3079F DIAST BP 80-89 MM HG: CPT | Performed by: FAMILY MEDICINE

## 2023-03-15 PROCEDURE — 1159F MED LIST DOCD IN RCRD: CPT | Performed by: FAMILY MEDICINE

## 2023-03-15 RX ORDER — ASPIRIN 81 MG/1
81 TABLET ORAL DAILY
COMMUNITY

## 2023-03-15 RX ORDER — PANTOPRAZOLE SODIUM 40 MG/1
40 TABLET, DELAYED RELEASE ORAL DAILY
COMMUNITY
Start: 2023-02-23

## 2023-03-15 RX ORDER — ATORVASTATIN CALCIUM 80 MG/1
80 TABLET, FILM COATED ORAL DAILY
COMMUNITY
Start: 2022-11-14

## 2023-03-15 RX ORDER — LISINOPRIL 20 MG/1
20 TABLET ORAL DAILY
COMMUNITY
Start: 2022-10-31

## 2023-03-15 RX ORDER — CLOPIDOGREL BISULFATE 75 MG/1
75 TABLET ORAL DAILY
COMMUNITY
Start: 2022-11-07

## 2023-03-15 NOTE — ASSESSMENT & PLAN NOTE
MULTIFACTORIAL.  LABS AS NOTED.  ADVISED TO REDUCE ALCOHOL INTAKE.  MAY NEED A SLEEP STUDY.  CONSIDER MEDS.

## 2023-03-15 NOTE — ASSESSMENT & PLAN NOTE
Lipid abnormalities are unchanged.  Nutritional counseling was provided.  Lipids will be reassessed in 3 months   UNKNOWN DEGREE OF CONTROL, ADVISED TO RESTART MED AND THAT, GIVEN HIS ATHEROSCLEROTIC HISTORY, HE NEEDS TO TAKE THE STATIN EVEN IF HIS NUMBERS ARE OK .

## 2023-03-15 NOTE — PROGRESS NOTES
Chief Complaint  Establish Care (From Dr Ellison)    Subjective          Duane Hemphill presents to [facility]  History of Present Illness  NEW PATIENT:  --TOLERATING BLOOD PRESSURE MEDICATION WITHOUT APPARENT SIDE EFFECTS  --WAS STARTED ON A STATIN AFTER HIS TIA AND CAROTID ARTERY SURGERY BUT HAS NOT BEEN TAKING IT  --LONGSTANDING ISSUES WITH KIRBY MORNING AWAKENING.  IS ON NO MEDS FOR THIS.  DOES NOT SNORE PER HIS WIFE.  DRINKS AT LEST SIX BEERS AN EVENING.  --CAROTID AND TIA HISTORY AS ABOVE, HAS NOT HAD A CARDIAC WROKUP  --WORSENING SHORT TERM MEMORY ISSUES  --BRIEF PAINS IN THE LEFT POSTERIOR UPPER NECK FOR THE PAST TWO WEEKS.  OK NOW         No Known Allergies     Health Maintenance Due   Topic Date Due   • COLORECTAL CANCER SCREENING  Never done   • HEPATITIS C SCREENING  Never done   • ANNUAL WELLNESS VISIT  Never done   • Pneumococcal Vaccine 65+ (1 - PCV) Never done   • TDAP/TD VACCINES (2 - Td or Tdap) 08/09/2021   • COVID-19 Vaccine (3 - Booster for Noelle series) 01/06/2022   • AAA SCREEN (ONE-TIME)  Never done        Current Outpatient Medications on File Prior to Visit   Medication Sig   • aspirin 81 MG EC tablet Take 1 tablet by mouth Daily.   • atorvastatin (LIPITOR) 80 MG tablet Take 1 tablet by mouth Daily.   • clopidogrel (PLAVIX) 75 MG tablet Take 1 tablet by mouth Daily.   • lisinopril (PRINIVIL,ZESTRIL) 20 MG tablet Take 1 tablet by mouth Daily.   • pantoprazole (PROTONIX) 40 MG EC tablet Take 1 tablet by mouth Daily.   • [DISCONTINUED] acetaminophen (TYLENOL) 325 MG tablet Take 2 tablets by mouth Every 4 (Four) Hours As Needed for Mild Pain .   • [DISCONTINUED] amoxicillin-clavulanate (AUGMENTIN) 875-125 MG per tablet    • [DISCONTINUED] bisacodyl (DULCOLAX) 10 MG suppository Insert 1 suppository into the rectum Daily As Needed for Constipation.   • [DISCONTINUED] cyclobenzaprine (FLEXERIL) 10 MG tablet Take 1 tablet by mouth 3 (Three) times a day as needed for  "muscle spasms.   • [DISCONTINUED] docusate sodium 100 MG capsule Take 1 capsule by mouth daily.   • [DISCONTINUED] gabapentin (NEURONTIN) 100 MG capsule Take 1 capsule by mouth 3 (Three) Times a Day.   • [DISCONTINUED] Multiple Vitamins-Minerals (CENTRUM SILVER ADULT 50+) tablet Take 1 tablet by mouth Daily.   • [DISCONTINUED] polyethylene glycol (MIRALAX) packet Take 17 g by mouth Daily.   • [DISCONTINUED] senna-docusate sodium (SENOKOT-S) 8.6-50 MG tablet Take 2 tablets by mouth every night.   • [DISCONTINUED] simethicone (MYLICON) 80 MG chewable tablet Chew 1 tablet 4 (Four) Times a Day As Needed for Flatulence.     No current facility-administered medications on file prior to visit.       Immunization History   Administered Date(s) Administered   • COVID-19 (ADAIR) 05/21/2021, 11/11/2021   • FLUAD TRI 65YR+ 11/14/2019   • Fluad Quad 65+ 09/29/2022   • Fluzone High-Dose 65+yrs 12/02/2020   • Influenza, Unspecified 11/11/2021   • Shingrix 01/12/2021, 01/26/2022   • Tdap 08/09/2011       Review of Systems   Constitutional: Negative for activity change, appetite change, chills, fatigue and fever.   HENT: Negative for congestion, ear pain, rhinorrhea and sore throat.    Respiratory: Negative for cough and shortness of breath.    Cardiovascular: Negative for chest pain, palpitations and leg swelling.   Gastrointestinal: Negative for abdominal pain, constipation, diarrhea, nausea and vomiting.   Musculoskeletal: Negative for arthralgias and myalgias.   Neurological: Negative for headache.        Objective     /89 (BP Location: Left arm, Patient Position: Sitting)   Pulse 76   Ht 175.3 cm (69\")   Wt 90.7 kg (200 lb)   SpO2 95% Comment: on room air  BMI 29.53 kg/m²       Physical Exam  Vitals and nursing note reviewed.   Constitutional:       General: He is not in acute distress.     Appearance: Normal appearance.   Neck:      Comments: NONTENDER BUT INDICATES LEFT LOWER OCCIPITAL MARGIN.    Cardiovascular: "      Rate and Rhythm: Normal rate and regular rhythm.      Heart sounds: Normal heart sounds. No murmur heard.  Pulmonary:      Effort: Pulmonary effort is normal.      Breath sounds: Normal breath sounds.   Abdominal:      Palpations: Abdomen is soft.      Tenderness: There is no abdominal tenderness.   Musculoskeletal:      Cervical back: Normal range of motion and neck supple. No rigidity or tenderness.      Right lower leg: No edema.      Left lower leg: No edema.   Lymphadenopathy:      Cervical: No cervical adenopathy.   Neurological:      General: No focal deficit present.      Mental Status: He is alert.      Cranial Nerves: No cranial nerve deficit.      Coordination: Coordination normal.      Gait: Gait normal.   Psychiatric:         Mood and Affect: Mood normal.         Behavior: Behavior normal.         Result Review :                             Assessment and Plan      Diagnoses and all orders for this visit:    1. Essential hypertension (Primary)  Assessment & Plan:  Hypertension is improving with treatment.  Continue current treatment regimen.  Continue current medications.  Blood pressure will be reassessed in 3 months.    Orders:  -     CBC (No Diff); Future  -     Comprehensive Metabolic Panel; Future  -     TSH Rfx On Abnormal To Free T4; Future    2. Poor short-term memory  Assessment & Plan:  MULTIFACTORIAL, WILL OBTAIN LABS AS NOTED.  MRI REPORT REVIEWED.  ADVISED TO REDUCE ALCOHOL INTAKE.  CONSIDER A NEUROLOGY EVAL     Orders:  -     Heavy Metals Profile II, Blood; Future    3. Other insomnia  Assessment & Plan:  MULTIFACTORIAL.  LABS AS NOTED.  ADVISED TO REDUCE ALCOHOL INTAKE.  MAY NEED A SLEEP STUDY.  CONSIDER MEDS.        4. High cholesterol  Assessment & Plan:  Lipid abnormalities are unchanged.  Nutritional counseling was provided.  Lipids will be reassessed in 3 months   UNKNOWN DEGREE OF CONTROL, ADVISED TO RESTART MED AND THAT, GIVEN HIS ATHEROSCLEROTIC HISTORY, HE NEEDS TO TAKE THE  STATIN EVEN IF HIS NUMBERS ARE OK .    Orders:  -     Lipid Panel; Future    5. At risk for coronary artery disease  Assessment & Plan:  CONSIDER A CARDIOLOGY EVAL (HE WANTS TO THINK ABOUT IT)       6. Screening for prostate cancer  -     PSA Screen; Future    7. Encounter for screening for other viral diseases  -     Hepatitis C Antibody; Future    8. Acute neck pain  Comments:  OBSERVE FOR NOW AS ONLY INTERMITTENT.  HEAT / COLD PACKS.  CONSIDER X-RAYS / P.T. EVAL           Follow Up     Return in about 3 months (around 6/15/2023).    Patient was given instructions and counseling regarding his condition or for health maintenance advice. Please see specific information pulled into the AVS if appropriate.

## 2023-03-15 NOTE — ASSESSMENT & PLAN NOTE
MULTIFACTORIAL, WILL OBTAIN LABS AS NOTED.  MRI REPORT REVIEWED.  ADVISED TO REDUCE ALCOHOL INTAKE.  CONSIDER A NEUROLOGY EVAL

## 2023-03-16 ENCOUNTER — LAB (OUTPATIENT)
Dept: LAB | Facility: HOSPITAL | Age: 68
End: 2023-03-16
Payer: MEDICARE

## 2023-03-16 DIAGNOSIS — Z12.5 SCREENING FOR PROSTATE CANCER: ICD-10-CM

## 2023-03-16 DIAGNOSIS — I10 ESSENTIAL HYPERTENSION: ICD-10-CM

## 2023-03-16 DIAGNOSIS — Z11.59 ENCOUNTER FOR SCREENING FOR OTHER VIRAL DISEASES: ICD-10-CM

## 2023-03-16 DIAGNOSIS — R41.3 POOR SHORT-TERM MEMORY: ICD-10-CM

## 2023-03-16 DIAGNOSIS — E78.00 HIGH CHOLESTEROL: ICD-10-CM

## 2023-03-16 LAB
ALBUMIN SERPL-MCNC: 4.5 G/DL (ref 3.5–5.2)
ALBUMIN/GLOB SERPL: 1.7 G/DL
ALP SERPL-CCNC: 116 U/L (ref 39–117)
ALT SERPL W P-5'-P-CCNC: 40 U/L (ref 1–41)
ANION GAP SERPL CALCULATED.3IONS-SCNC: 11 MMOL/L (ref 5–15)
AST SERPL-CCNC: 34 U/L (ref 1–40)
BILIRUB SERPL-MCNC: 0.5 MG/DL (ref 0–1.2)
BUN SERPL-MCNC: 21 MG/DL (ref 8–23)
BUN/CREAT SERPL: 15 (ref 7–25)
CALCIUM SPEC-SCNC: 9.3 MG/DL (ref 8.6–10.5)
CHLORIDE SERPL-SCNC: 102 MMOL/L (ref 98–107)
CHOLEST SERPL-MCNC: 206 MG/DL (ref 0–200)
CO2 SERPL-SCNC: 25 MMOL/L (ref 22–29)
CREAT SERPL-MCNC: 1.4 MG/DL (ref 0.76–1.27)
DEPRECATED RDW RBC AUTO: 44.7 FL (ref 37–54)
EGFRCR SERPLBLD CKD-EPI 2021: 54.4 ML/MIN/1.73
ERYTHROCYTE [DISTWIDTH] IN BLOOD BY AUTOMATED COUNT: 12.5 % (ref 12.3–15.4)
GLOBULIN UR ELPH-MCNC: 2.6 GM/DL
GLUCOSE SERPL-MCNC: 100 MG/DL (ref 65–99)
HCT VFR BLD AUTO: 46.9 % (ref 37.5–51)
HCV AB SER DONR QL: NORMAL
HDLC SERPL-MCNC: 39 MG/DL (ref 40–60)
HGB BLD-MCNC: 16 G/DL (ref 13–17.7)
LDLC SERPL CALC-MCNC: 130 MG/DL (ref 0–100)
LDLC/HDLC SERPL: 3.22 {RATIO}
MCH RBC QN AUTO: 32.5 PG (ref 26.6–33)
MCHC RBC AUTO-ENTMCNC: 34.1 G/DL (ref 31.5–35.7)
MCV RBC AUTO: 95.3 FL (ref 79–97)
PLATELET # BLD AUTO: 300 10*3/MM3 (ref 140–450)
PMV BLD AUTO: 10.1 FL (ref 6–12)
POTASSIUM SERPL-SCNC: 5.1 MMOL/L (ref 3.5–5.2)
PROT SERPL-MCNC: 7.1 G/DL (ref 6–8.5)
PSA SERPL-MCNC: 1.14 NG/ML (ref 0–4)
RBC # BLD AUTO: 4.92 10*6/MM3 (ref 4.14–5.8)
SODIUM SERPL-SCNC: 138 MMOL/L (ref 136–145)
TRIGL SERPL-MCNC: 207 MG/DL (ref 0–150)
TSH SERPL DL<=0.05 MIU/L-ACNC: 1.73 UIU/ML (ref 0.27–4.2)
VLDLC SERPL-MCNC: 37 MG/DL (ref 5–40)
WBC NRBC COR # BLD: 7.48 10*3/MM3 (ref 3.4–10.8)

## 2023-03-16 PROCEDURE — 83655 ASSAY OF LEAD: CPT

## 2023-03-16 PROCEDURE — 80061 LIPID PANEL: CPT

## 2023-03-16 PROCEDURE — 80053 COMPREHEN METABOLIC PANEL: CPT

## 2023-03-16 PROCEDURE — 84443 ASSAY THYROID STIM HORMONE: CPT

## 2023-03-16 PROCEDURE — 83825 ASSAY OF MERCURY: CPT

## 2023-03-16 PROCEDURE — G0103 PSA SCREENING: HCPCS

## 2023-03-16 PROCEDURE — 36415 COLL VENOUS BLD VENIPUNCTURE: CPT

## 2023-03-16 PROCEDURE — 85027 COMPLETE CBC AUTOMATED: CPT

## 2023-03-16 PROCEDURE — 86803 HEPATITIS C AB TEST: CPT

## 2023-03-16 PROCEDURE — 82175 ASSAY OF ARSENIC: CPT

## 2023-03-16 PROCEDURE — 82300 ASSAY OF CADMIUM: CPT

## 2023-03-17 DIAGNOSIS — E78.00 HIGH CHOLESTEROL: Primary | ICD-10-CM

## 2023-03-22 LAB
ARSENIC BLD-MCNC: 1 UG/L (ref 0–9)
CADMIUM BLD-MCNC: <0.5 UG/L (ref 0–1.2)
LEAD BLDV-MCNC: 1.8 UG/DL (ref 0–3.4)
MERCURY BLD-MCNC: <1 UG/L (ref 0–14.9)

## 2023-06-23 PROBLEM — F10.10 EXCESSIVE DRINKING OF ALCOHOL: Status: ACTIVE | Noted: 2023-06-23

## 2023-06-23 PROBLEM — K21.9 GERD WITHOUT ESOPHAGITIS: Status: ACTIVE | Noted: 2023-06-23

## 2023-06-23 PROBLEM — R41.3 POOR SHORT-TERM MEMORY: Status: RESOLVED | Noted: 2023-03-15 | Resolved: 2023-06-23

## 2023-06-23 PROBLEM — Z91.89 AT RISK FOR CORONARY ARTERY DISEASE: Status: RESOLVED | Noted: 2023-03-15 | Resolved: 2023-06-23

## 2023-08-10 RX ORDER — CLOPIDOGREL BISULFATE 75 MG/1
TABLET ORAL
Qty: 90 TABLET | Refills: 0 | Status: SHIPPED | OUTPATIENT
Start: 2023-08-10

## 2023-08-30 RX ORDER — LISINOPRIL 20 MG/1
TABLET ORAL
Qty: 90 TABLET | Refills: 1 | Status: SHIPPED | OUTPATIENT
Start: 2023-08-30

## 2023-08-30 NOTE — TELEPHONE ENCOUNTER
Rx Refill Note  Requested Prescriptions     Pending Prescriptions Disp Refills    lisinopril (PRINIVIL,ZESTRIL) 20 MG tablet [Pharmacy Med Name: lisinopril 20 mg tablet] 90 tablet 11     Sig: Take 1 tablet BY MOUTH EVERY DAY      Last office visit with prescribing clinician: 6/23/2023   Last telemedicine visit with prescribing clinician: Visit date not found   Next office visit with prescribing clinician: 10/23/2023     Christy Dominguez LPN  08/30/23, 14:12 EDT    NOT FILLED BY YOU PREVIOUSLY, OK TO FILL UNDER YOU?

## 2023-09-28 ENCOUNTER — OFFICE VISIT (OUTPATIENT)
Dept: FAMILY MEDICINE CLINIC | Age: 68
End: 2023-09-28
Payer: MEDICARE

## 2023-09-28 VITALS — TEMPERATURE: 97.5 F | HEIGHT: 69 IN | WEIGHT: 187.8 LBS | BODY MASS INDEX: 27.81 KG/M2 | OXYGEN SATURATION: 98 %

## 2023-09-28 DIAGNOSIS — N17.9 ACUTE KIDNEY INJURY: ICD-10-CM

## 2023-09-28 DIAGNOSIS — H66.93 OTITIS OF BOTH EARS: Primary | ICD-10-CM

## 2023-09-28 DIAGNOSIS — I10 ESSENTIAL HYPERTENSION: ICD-10-CM

## 2023-09-28 DIAGNOSIS — R55 SYNCOPE, UNSPECIFIED SYNCOPE TYPE: ICD-10-CM

## 2023-09-28 RX ORDER — CEPHALEXIN 500 MG/1
500 CAPSULE ORAL 2 TIMES DAILY
Qty: 10 CAPSULE | Refills: 0 | Status: SHIPPED | OUTPATIENT
Start: 2023-09-28 | End: 2023-10-03

## 2023-09-28 NOTE — PROGRESS NOTES
"Chief Complaint  Dizziness (Pt states he felt dizzy on 9-18 and was seen at [facility] that same day and they told him to come see his primary office for kidney injury but they had in between that visit and this one )    Subjective    Patient is in today for a follow-up after visiting the emergency room on September 18 with complaints of a syncopal episode.  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  He was treated in the emergency room for dehydration which helped moderately, although patient patient reports that he has had a dizzy episode since while driving.        Duane Hemphill presents to [facility]  Dizziness  This is a recurrent problem. The problem has been waxing and waning. Associated symptoms include congestion, fatigue, headaches and vertigo. Pertinent negatives include no chest pain, chills, coughing or fever. The symptoms are aggravated by exertion (driving). He has tried rest, relaxation and position changes for the symptoms. The treatment provided moderate relief.     Objective   Vital Signs:  Temp 97.5 °F (36.4 °C) (Oral)   Ht 175.3 cm (69\")   Wt 85.2 kg (187 lb 12.8 oz)   SpO2 98%   BMI 27.73 kg/m²   Estimated body mass index is 27.73 kg/m² as calculated from the following:    Height as of this encounter: 175.3 cm (69\").    Weight as of this encounter: 85.2 kg (187 lb 12.8 oz).     Vitals:    09/28/23 1037 09/28/23 1042 09/28/23 1046   Orthostatic BP: 147/82 136/77 141/89   Orthostatic Pulse: 56 53 65   Patient Position: Lying Sitting Standing               Physical Exam  HENT:      Head: Normocephalic.      Right Ear: A middle ear effusion is present.      Left Ear: A middle ear effusion is present.      Nose: Congestion present.      Right Sinus: Maxillary sinus tenderness present.      Left Sinus: Maxillary sinus tenderness present.      " Mouth/Throat:      Pharynx: Posterior oropharyngeal erythema present.   Cardiovascular:      Rate and Rhythm: Normal rate and regular rhythm.   Pulmonary:      Effort: Pulmonary effort is normal. No respiratory distress.      Breath sounds: Normal breath sounds. No stridor. No wheezing, rhonchi or rales.   Skin:     General: Skin is warm and dry.   Neurological:      Mental Status: He is alert and oriented to person, place, and time.   Psychiatric:         Mood and Affect: Mood normal.      Result Review :    CBC          3/16/2023    08:35   CBC   WBC 7.48    RBC 4.92    Hemoglobin 16.0    Hematocrit 46.9    MCV 95.3    MCH 32.5    MCHC 34.1    RDW 12.5    Platelets 300      Renal Profile          3/16/2023    08:35 6/27/2023    08:58   Renal Profile   BUN 21  21    Creatinine 1.40  1.35                   Assessment and Plan   Diagnoses and all orders for this visit:    1. Otitis of both ears (Primary)  Comments:  OTC allergy medication recommended if symptoms persist or return  Orders:  -     cephalexin (Keflex) 500 MG capsule; Take 1 capsule by mouth 2 (Two) Times a Day for 5 days.  Dispense: 10 capsule; Refill: 0    2. Essential hypertension  Comments:  Monitor BP at home, notify PCP if elevation continues, /120 or with CP or syncope    3. Acute kidney injury    4. Syncope, unspecified syncope type    Patient has significant amount of fluid on his ears today, that along with the symptoms beginning after being pushed hogging tall weeds, and the current elevation of ragweed in her area, will begin on treatment for otitis media/sinus infection.  Discussed in length, the importance of hydration and decreased alcohol intake.  Patient should go to the emergency room if symptoms worsen, he experienced chest pain, shortness of breath, or inability to void.  He has a follow-up with his primary care provider in 25 days in which repeat labs to assess kidney function will be done, and patient would like to wait until  that time referral to nephrology or cardiology is appropriate.         Follow Up   Return in 25 days (on 10/23/2023), or if symptoms worsen or fail to improve, for Next scheduled follow up with PCP.  Patient was given instructions and counseling regarding his condition or for health maintenance advice. Please see specific information pulled into the AVS if appropriate.

## 2023-10-23 ENCOUNTER — OFFICE VISIT (OUTPATIENT)
Dept: FAMILY MEDICINE CLINIC | Age: 68
End: 2023-10-23
Payer: MEDICARE

## 2023-10-23 VITALS
WEIGHT: 189.6 LBS | BODY MASS INDEX: 28.08 KG/M2 | DIASTOLIC BLOOD PRESSURE: 80 MMHG | SYSTOLIC BLOOD PRESSURE: 156 MMHG | HEIGHT: 69 IN | HEART RATE: 56 BPM

## 2023-10-23 DIAGNOSIS — K21.9 GERD WITHOUT ESOPHAGITIS: ICD-10-CM

## 2023-10-23 DIAGNOSIS — H91.93 HEARING DIFFICULTY, BILATERAL: ICD-10-CM

## 2023-10-23 DIAGNOSIS — E78.00 HIGH CHOLESTEROL: ICD-10-CM

## 2023-10-23 DIAGNOSIS — R42 DIZZINESS: ICD-10-CM

## 2023-10-23 DIAGNOSIS — I10 ESSENTIAL HYPERTENSION: Primary | ICD-10-CM

## 2023-10-23 PROBLEM — G47.09 OTHER INSOMNIA: Status: RESOLVED | Noted: 2023-03-15 | Resolved: 2023-10-23

## 2023-10-23 PROBLEM — F10.10 EXCESSIVE DRINKING OF ALCOHOL: Status: RESOLVED | Noted: 2023-06-23 | Resolved: 2023-10-23

## 2023-10-23 PROCEDURE — 3079F DIAST BP 80-89 MM HG: CPT | Performed by: FAMILY MEDICINE

## 2023-10-23 PROCEDURE — 1160F RVW MEDS BY RX/DR IN RCRD: CPT | Performed by: FAMILY MEDICINE

## 2023-10-23 PROCEDURE — 1159F MED LIST DOCD IN RCRD: CPT | Performed by: FAMILY MEDICINE

## 2023-10-23 PROCEDURE — 3077F SYST BP >= 140 MM HG: CPT | Performed by: FAMILY MEDICINE

## 2023-10-23 PROCEDURE — 99214 OFFICE O/P EST MOD 30 MIN: CPT | Performed by: FAMILY MEDICINE

## 2023-10-23 NOTE — PROGRESS NOTES
Chief Complaint  Hypertension and Dizziness    Subjective          Duane Hemphill presents to [facility]  History of Present Illness  --TOLERATING BLOOD PRESSURE MEDICATION WITHOUT APPARENT SIDE EFFECTS  --GERD IS WELL CONTROLLED WITH THE PPI  --LAST LIPIDS WERE OK, NO ISSUES WITH THE STATIN  --INTERMITTENT EPISODES OF SWAYING WITH ONE SYNCOPAL EPISODE, NEG CT AND DOPPER IN THE ER.  HEARING HAS ALSO BEEN WORSE RECENTLY, DEPITE HAVING HEARING AIDS.  FEELS OK TODAY         No Known Allergies     Health Maintenance Due   Topic Date Due    COLORECTAL CANCER SCREENING  Never done    ANNUAL WELLNESS VISIT  Never done    AAA SCREEN (ONE-TIME)  Never done        Current Outpatient Medications on File Prior to Visit   Medication Sig    aspirin 81 MG EC tablet Take 1 tablet by mouth Daily.    atorvastatin (LIPITOR) 80 MG tablet Take 1 tablet by mouth Daily.    clopidogrel (PLAVIX) 75 MG tablet TAKE 1 TABLET BY MOUTH ONCE DAILY    lisinopril (PRINIVIL,ZESTRIL) 20 MG tablet Take 1 tablet BY MOUTH EVERY DAY    pantoprazole (PROTONIX) 40 MG EC tablet Take 1 tablet by mouth Daily.     No current facility-administered medications on file prior to visit.       Immunization History   Administered Date(s) Administered    COVID-19 (ADAIR) 05/21/2021, 11/11/2021    COVID-19 F23 (PFIZER) 12YRS+ (COMIRNATY) 10/19/2023    FLUAD TRI 65YR+ 11/14/2019    Fluad Quad 65+ 09/29/2022, 10/17/2023    Fluzone High-Dose 65+yrs 12/02/2020    Influenza, Unspecified 11/11/2021    Pneumococcal Conjugate 20-Valent (PCV20) 09/28/2023    Shingrix 01/12/2021, 01/26/2022    Tdap 08/09/2011       Review of Systems   Constitutional:  Negative for activity change, appetite change, chills, fatigue and fever.   HENT:  Negative for congestion, ear pain, rhinorrhea and sore throat.    Respiratory:  Negative for cough and shortness of breath.    Cardiovascular:  Negative for chest pain, palpitations and leg swelling.  "  Gastrointestinal:  Negative for abdominal pain, constipation, diarrhea, nausea and vomiting.   Musculoskeletal:  Negative for arthralgias and myalgias.   Neurological:  Negative for headache.        Objective     /80 (BP Location: Left arm, Patient Position: Sitting)   Pulse 56   Ht 175.3 cm (69\")   Wt 86 kg (189 lb 9.6 oz)   BMI 28.00 kg/m²       Physical Exam  Vitals and nursing note reviewed.   Constitutional:       General: He is not in acute distress.     Appearance: Normal appearance.   HENT:      Right Ear: Tympanic membrane normal.      Left Ear: Tympanic membrane normal.      Mouth/Throat:      Pharynx: Oropharynx is clear.   Eyes:      Conjunctiva/sclera: Conjunctivae normal.   Cardiovascular:      Rate and Rhythm: Normal rate and regular rhythm.      Heart sounds: Normal heart sounds. No murmur heard.  Pulmonary:      Effort: Pulmonary effort is normal.      Breath sounds: Normal breath sounds.   Abdominal:      Palpations: Abdomen is soft.      Tenderness: There is no abdominal tenderness.   Musculoskeletal:      Cervical back: Neck supple.      Right lower leg: No edema.      Left lower leg: No edema.   Lymphadenopathy:      Cervical: No cervical adenopathy.   Neurological:      General: No focal deficit present.      Mental Status: He is alert.      Cranial Nerves: No cranial nerve deficit.      Coordination: Coordination normal.      Gait: Gait normal.   Psychiatric:         Mood and Affect: Mood normal.         Behavior: Behavior normal.         Result Review :                             Assessment and Plan      Diagnoses and all orders for this visit:    1. Essential hypertension (Primary)  Assessment & Plan:  Hypertension is improving with treatment.  Continue current treatment regimen.  Blood pressure will be reassessed at the next regular appointment.      2. GERD without esophagitis  Assessment & Plan:  IMPROVED WITH CURRENT TREATMENT, CONTINUE SAME, WILL REEVALUATE AT NEXT VISIT "       3. High cholesterol  Assessment & Plan:  Lipid abnormalities are improving with treatment.  Nutritional counseling was provided.  Lipids will be reassessed in 6 months.      4. Hearing difficulty, bilateral  Assessment & Plan:  GETTING WORSE, DESPITE HEARING AIDS, WILL SEND FOR AN ENT EVAL     Orders:  -     Ambulatory Referral to ENT (Otolaryngology)    5. Dizziness (brief, intermittent)  Assessment & Plan:  CT AND DOPPLER REPORTS REVIEWED.  WILL SEND TO ENT AS NOTED     Orders:  -     Ambulatory Referral to ENT (Otolaryngology)            Follow Up     Return in about 6 months (around 4/23/2024).    Patient was given instructions and counseling regarding his condition or for health maintenance advice. Please see specific information pulled into the AVS if appropriate.

## 2023-11-16 RX ORDER — CLOPIDOGREL BISULFATE 75 MG/1
TABLET ORAL
Qty: 90 TABLET | Refills: 1 | Status: SHIPPED | OUTPATIENT
Start: 2023-11-16

## 2024-01-29 RX ORDER — ATORVASTATIN CALCIUM 80 MG/1
80 TABLET, FILM COATED ORAL DAILY
Qty: 90 TABLET | Refills: 0 | Status: SHIPPED | OUTPATIENT
Start: 2024-01-29

## 2024-02-17 RX ORDER — LISINOPRIL 20 MG/1
20 TABLET ORAL DAILY
Qty: 90 TABLET | Refills: 1 | Status: SHIPPED | OUTPATIENT
Start: 2024-02-17

## 2024-04-24 ENCOUNTER — OFFICE VISIT (OUTPATIENT)
Dept: FAMILY MEDICINE CLINIC | Age: 69
End: 2024-04-24
Payer: MEDICARE

## 2024-04-24 ENCOUNTER — LAB (OUTPATIENT)
Dept: LAB | Facility: HOSPITAL | Age: 69
End: 2024-04-24
Payer: MEDICARE

## 2024-04-24 VITALS
BODY MASS INDEX: 28.88 KG/M2 | WEIGHT: 195 LBS | HEIGHT: 69 IN | DIASTOLIC BLOOD PRESSURE: 88 MMHG | HEART RATE: 59 BPM | TEMPERATURE: 97.9 F | SYSTOLIC BLOOD PRESSURE: 154 MMHG

## 2024-04-24 DIAGNOSIS — Z00.00 MEDICARE ANNUAL WELLNESS VISIT, SUBSEQUENT: Primary | ICD-10-CM

## 2024-04-24 DIAGNOSIS — M79.674 TOE PAIN, RIGHT: ICD-10-CM

## 2024-04-24 DIAGNOSIS — R42 DIZZINESS: ICD-10-CM

## 2024-04-24 DIAGNOSIS — Z12.5 SCREENING FOR PROSTATE CANCER: ICD-10-CM

## 2024-04-24 DIAGNOSIS — E78.00 HIGH CHOLESTEROL: ICD-10-CM

## 2024-04-24 DIAGNOSIS — I10 ESSENTIAL HYPERTENSION: ICD-10-CM

## 2024-04-24 DIAGNOSIS — K21.9 GERD WITHOUT ESOPHAGITIS: ICD-10-CM

## 2024-04-24 LAB
ALBUMIN SERPL-MCNC: 4.7 G/DL (ref 3.5–5.2)
ALBUMIN/GLOB SERPL: 1.7 G/DL
ALP SERPL-CCNC: 184 U/L (ref 39–117)
ALT SERPL W P-5'-P-CCNC: 34 U/L (ref 1–41)
ANION GAP SERPL CALCULATED.3IONS-SCNC: 10 MMOL/L (ref 5–15)
AST SERPL-CCNC: 30 U/L (ref 1–40)
BILIRUB SERPL-MCNC: 1 MG/DL (ref 0–1.2)
BUN SERPL-MCNC: 24 MG/DL (ref 8–23)
BUN/CREAT SERPL: 16 (ref 7–25)
CALCIUM SPEC-SCNC: 9.7 MG/DL (ref 8.6–10.5)
CHLORIDE SERPL-SCNC: 103 MMOL/L (ref 98–107)
CHOLEST SERPL-MCNC: 137 MG/DL (ref 0–200)
CO2 SERPL-SCNC: 23 MMOL/L (ref 22–29)
CREAT SERPL-MCNC: 1.5 MG/DL (ref 0.76–1.27)
DEPRECATED RDW RBC AUTO: 45.1 FL (ref 37–54)
EGFRCR SERPLBLD CKD-EPI 2021: 49.8 ML/MIN/1.73
ERYTHROCYTE [DISTWIDTH] IN BLOOD BY AUTOMATED COUNT: 12.5 % (ref 12.3–15.4)
GLOBULIN UR ELPH-MCNC: 2.8 GM/DL
GLUCOSE SERPL-MCNC: 87 MG/DL (ref 65–99)
HCT VFR BLD AUTO: 48.3 % (ref 37.5–51)
HDLC SERPL-MCNC: 32 MG/DL (ref 40–60)
HGB BLD-MCNC: 16.3 G/DL (ref 13–17.7)
LDLC SERPL CALC-MCNC: 83 MG/DL (ref 0–100)
LDLC/HDLC SERPL: 2.52 {RATIO}
MCH RBC QN AUTO: 32.5 PG (ref 26.6–33)
MCHC RBC AUTO-ENTMCNC: 33.7 G/DL (ref 31.5–35.7)
MCV RBC AUTO: 96.2 FL (ref 79–97)
PLATELET # BLD AUTO: 309 10*3/MM3 (ref 140–450)
PMV BLD AUTO: 10.8 FL (ref 6–12)
POTASSIUM SERPL-SCNC: 5.1 MMOL/L (ref 3.5–5.2)
PROT SERPL-MCNC: 7.5 G/DL (ref 6–8.5)
PSA SERPL-MCNC: 1.27 NG/ML (ref 0–4)
RBC # BLD AUTO: 5.02 10*6/MM3 (ref 4.14–5.8)
SODIUM SERPL-SCNC: 136 MMOL/L (ref 136–145)
TRIGL SERPL-MCNC: 122 MG/DL (ref 0–150)
TSH SERPL DL<=0.05 MIU/L-ACNC: 2.16 UIU/ML (ref 0.27–4.2)
VLDLC SERPL-MCNC: 22 MG/DL (ref 5–40)
WBC NRBC COR # BLD AUTO: 7.19 10*3/MM3 (ref 3.4–10.8)

## 2024-04-24 PROCEDURE — 1170F FXNL STATUS ASSESSED: CPT | Performed by: FAMILY MEDICINE

## 2024-04-24 PROCEDURE — 1159F MED LIST DOCD IN RCRD: CPT | Performed by: FAMILY MEDICINE

## 2024-04-24 PROCEDURE — 80053 COMPREHEN METABOLIC PANEL: CPT | Performed by: FAMILY MEDICINE

## 2024-04-24 PROCEDURE — G0439 PPPS, SUBSEQ VISIT: HCPCS | Performed by: FAMILY MEDICINE

## 2024-04-24 PROCEDURE — 36415 COLL VENOUS BLD VENIPUNCTURE: CPT | Performed by: FAMILY MEDICINE

## 2024-04-24 PROCEDURE — 85027 COMPLETE CBC AUTOMATED: CPT | Performed by: FAMILY MEDICINE

## 2024-04-24 PROCEDURE — G0103 PSA SCREENING: HCPCS | Performed by: FAMILY MEDICINE

## 2024-04-24 PROCEDURE — 1160F RVW MEDS BY RX/DR IN RCRD: CPT | Performed by: FAMILY MEDICINE

## 2024-04-24 PROCEDURE — 3077F SYST BP >= 140 MM HG: CPT | Performed by: FAMILY MEDICINE

## 2024-04-24 PROCEDURE — 84443 ASSAY THYROID STIM HORMONE: CPT | Performed by: FAMILY MEDICINE

## 2024-04-24 PROCEDURE — 80061 LIPID PANEL: CPT | Performed by: FAMILY MEDICINE

## 2024-04-24 PROCEDURE — 3079F DIAST BP 80-89 MM HG: CPT | Performed by: FAMILY MEDICINE

## 2024-04-24 PROCEDURE — 99214 OFFICE O/P EST MOD 30 MIN: CPT | Performed by: FAMILY MEDICINE

## 2024-04-24 NOTE — PROGRESS NOTES
The ABCs of the Annual Wellness Visit  Subsequent Medicare Wellness Visit    Subjective    Duane Hemphill is a 68 y.o. male who presents for a Subsequent Medicare Wellness Visit.    The following portions of the patient's history were reviewed and   updated as appropriate: allergies, current medications, past family history, past medical history, past social history, past surgical history, and problem list.    Compared to one year ago, the patient feels his physical   health is better.    Compared to one year ago, the patient feels his mental   health is the same.    Recent Hospitalizations:  He was not admitted to the hospital during the last year.       Current Medical Providers:  Patient Care Team:  Ricky Hinton MD as PCP - General (Family Medicine)  Handy Freeman MD as Surgeon (Orthopedic Surgery)  Saurav Rasheed MD as Surgeon (Neurosurgery)  Quinn Mg MD (Vascular Surgery)    Outpatient Medications Prior to Visit   Medication Sig Dispense Refill    aspirin 81 MG EC tablet Take 1 tablet by mouth Daily.      atorvastatin (LIPITOR) 80 MG tablet TAKE 1 TABLET BY MOUTH ONCE DAILY 90 tablet 0    clopidogrel (PLAVIX) 75 MG tablet TAKE 1 TABLET BY MOUTH ONCE DAILY 90 tablet 1    lisinopril (PRINIVIL,ZESTRIL) 20 MG tablet TAKE 1 TABLET BY MOUTH ONCE DAILY 90 tablet 1    pantoprazole (PROTONIX) 40 MG EC tablet Take 1 tablet by mouth As Needed.       No facility-administered medications prior to visit.       No opioid medication identified on active medication list. I have reviewed chart for other potential  high risk medication/s and harmful drug interactions in the elderly.        Aspirin is on active medication list. Aspirin use is indicated based on review of current medical condition/s. Pros and cons of this therapy have been discussed today. Benefits of this medication outweigh potential harm.  Patient has been encouraged to continue taking this medication.  .      Patient Active Problem  "List   Diagnosis    Essential hypertension    History of TIA (transient ischemic attack)    Bilateral carotid artery stenosis (s/p right endarterectomy)     High cholesterol    GERD without esophagitis    Hearing difficulty, bilateral    Dizziness (brief, intermittent)    Medicare annual wellness visit, subsequent    Toe pain, intermittent, right fourth     Advance Care Planning   Advance Care Planning     Advance Directive is not on file.  ACP discussion was held with the patient during this visit. Patient does not have an advance directive, declines further assistance.     Objective    Vitals:    24 0807   BP: 154/88   BP Location: Right arm   Patient Position: Sitting   Cuff Size: Large Adult   Pulse: 59   Temp: 97.9 °F (36.6 °C)   TempSrc: Oral   Weight: 88.5 kg (195 lb)   Height: 175.3 cm (69\")     Estimated body mass index is 28.8 kg/m² as calculated from the following:    Height as of this encounter: 175.3 cm (69\").    Weight as of this encounter: 88.5 kg (195 lb).    BMI is >= 25 and <30. (Overweight) The following options were offered after discussion;: nutrition counseling/recommendations      Does the patient have evidence of cognitive impairment? No          HEALTH RISK ASSESSMENT    Smoking Status:  Social History     Tobacco Use   Smoking Status Former    Types: Cigars    Quit date:     Years since quittin.3   Smokeless Tobacco Never   Tobacco Comments    CHEWS ON A CIGAR     Alcohol Consumption:  Social History     Substance and Sexual Activity   Alcohol Use Yes    Comment: AN OCC GLASS OF BEER     Fall Risk Screen:    AYO Fall Risk Assessment was completed, and patient is at HIGH risk for falls. Assessment completed on:2024    Depression Screenin/24/2024     8:10 AM   PHQ-2/PHQ-9 Depression Screening   Little Interest or Pleasure in Doing Things 0-->not at all   Feeling Down, Depressed or Hopeless 0-->not at all   PHQ-9: Brief Depression Severity Measure Score 0 "       Health Habits and Functional and Cognitive Screenin/24/2024     8:11 AM   Functional & Cognitive Status   Do you have difficulty preparing food and eating? No   Do you have difficulty bathing yourself, getting dressed or grooming yourself? No   Do you have difficulty using the toilet? No   Do you have difficulty moving around from place to place? No   Do you have trouble with steps or getting out of a bed or a chair? No   Current Diet Well Balanced Diet   Dental Exam Up to date   Eye Exam Up to date   Exercise (times per week) 0 times per week   Current Exercises Include No Regular Exercise   Do you need help using the phone?  No   Are you deaf or do you have serious difficulty hearing?  Yes   Do you need help to go to places out of walking distance? No   Do you need help shopping? No   Do you need help preparing meals?  No   Do you need help with housework?  No   Do you need help with laundry? No   Do you need help taking your medications? No   Do you need help managing money? No   Do you ever drive or ride in a car without wearing a seat belt? No   Have you felt unusual stress, anger or loneliness in the last month? No   Who do you live with? Spouse   If you need help, do you have trouble finding someone available to you? No   Have you been bothered in the last four weeks by sexual problems? No   Do you have difficulty concentrating, remembering or making decisions? No       Age-appropriate Screening Schedule:  Refer to the list below for future screening recommendations based on patient's age, sex and/or medical conditions. Orders for these recommended tests are listed in the plan section. The patient has been provided with a written plan.    Health Maintenance   Topic Date Due    BMI FOLLOWUP  03/15/2024    TDAP/TD VACCINES (2 - Td or Tdap) 10/23/2024 (Originally 2021)    RSV Vaccine - Adults (1 - 1-dose 60+ series) 2025 (Originally 2013)    LIPID PANEL  2024    INFLUENZA  VACCINE  08/01/2024    ANNUAL WELLNESS VISIT  04/24/2025    COLORECTAL CANCER SCREENING  04/22/2028    HEPATITIS C SCREENING  Completed    COVID-19 Vaccine  Completed    Pneumococcal Vaccine 65+  Completed    AAA SCREEN (ONE-TIME)  Completed    ZOSTER VACCINE  Completed                  CMS Preventative Services Quick Reference  Risk Factors Identified During Encounter  None Identified  The above risks/problems have been discussed with the patient.  Pertinent information has been shared with the patient in the After Visit Summary.  An After Visit Summary and PPPS were made available to the patient.    Follow Up:   Next Medicare Wellness visit to be scheduled in 1 year.       Additional E&M Note during same encounter follows:  Patient has multiple medical problems which are significant and separately identifiable that require additional work above and beyond the Medicare Wellness Visit.      Chief Complaint  Medicare Wellness-subsequent    Subjective        --TOLERATING BLOOD PRESSURE MEDICATION WITHOUT APPARENT SIDE EFFECTS  --LAST LIPIDS WERE OK, NO ISSUES WITH THE STATIN  --GERD IS WELL CONTROLLED WITH THE PPI  --NO RECURRENCE OF THE DIZZINESS, WILL SEE ENT SOON  --INTERMITTENT RIGHT FOURTH TOW PAIN, IN THE AREA WHERE THE NAIL WAS REMOVED SEVERAL YEARS AGO.  SAW PODIATRY, GOT A SHOT, BUT PAIN STILL RECURS FROM TIME TO TIME      Duane Hemphill is also being seen today for BLOOD PRESSURE, CHOLESTEROL, GERD, TOE PAIN    Review of Systems   Constitutional:  Negative for activity change, appetite change, chills, fatigue and fever.   HENT:  Negative for congestion, ear pain, hearing loss, rhinorrhea and sore throat.    Eyes:  Negative for discharge and visual disturbance.   Respiratory:  Negative for cough and shortness of breath.    Cardiovascular:  Negative for chest pain, palpitations and leg swelling.   Gastrointestinal:  Negative for abdominal pain, nausea and vomiting.   Genitourinary:  Negative for dysuria and  "hematuria.   Musculoskeletal:  Negative for arthralgias and myalgias.   Psychiatric/Behavioral:  Negative for dysphoric mood.        Objective   Vital Signs:  /88 (BP Location: Right arm, Patient Position: Sitting, Cuff Size: Large Adult)   Pulse 59   Temp 97.9 °F (36.6 °C) (Oral)   Ht 175.3 cm (69\")   Wt 88.5 kg (195 lb)   BMI 28.80 kg/m²     Physical Exam  Vitals and nursing note reviewed.   Constitutional:       General: He is not in acute distress.     Appearance: Normal appearance.   Neck:      Vascular: No carotid bruit.   Cardiovascular:      Rate and Rhythm: Normal rate and regular rhythm.      Heart sounds: Normal heart sounds. No murmur heard.  Pulmonary:      Effort: Pulmonary effort is normal.      Breath sounds: Normal breath sounds.   Abdominal:      Palpations: Abdomen is soft.      Tenderness: There is no abdominal tenderness.   Musculoskeletal:      Cervical back: Neck supple.      Right lower leg: No edema.      Left lower leg: No edema.      Comments: RIGHT FOOT WITH GOOD DP PULSE, STRENGTH, SENSATION AND ROM.  FOURTH TOENAIL HAS BEEN ROMOVED.  NO ERYTHEMA OR TENDERNESS     Lymphadenopathy:      Cervical: No cervical adenopathy.   Neurological:      General: No focal deficit present.      Mental Status: He is alert.      Cranial Nerves: No cranial nerve deficit.      Coordination: Coordination normal.      Gait: Gait normal.   Psychiatric:         Mood and Affect: Mood normal.         Behavior: Behavior normal.          The following data was reviewed by: Ricky Hinton MD on 04/24/2024:  Common labs          6/27/2023    08:58   Common Labs   Glucose 95    BUN 21    Creatinine 1.35    Sodium 136    Potassium 4.3    Chloride 103    Calcium 10.0    Albumin 5.1    Total Bilirubin 0.6    Alkaline Phosphatase 112    AST (SGOT) 30    ALT (SGPT) 38    Total Cholesterol 153    Triglycerides 162    HDL Cholesterol 36    LDL Cholesterol  89                 Assessment and Plan "   Diagnoses and all orders for this visit:    1. Medicare annual wellness visit, subsequent (Primary)  Assessment & Plan:  ADVICE GIVEN RE:  SEATBELT USE, ALCOHOL USE, HEALTHY DIET, ROUTINE EYE AND DENTAL EXAM, ROUTINE VACCINATIONS.        2. Essential hypertension  Assessment & Plan:  Hypertension is stable and controlled  Continue current treatment regimen.  Blood pressure will be reassessed in 6 months.    Orders:  -     TSH Rfx On Abnormal To Free T4    3. GERD without esophagitis  Assessment & Plan:  IMPROVED WITH CURRENT TREATMENT, CONTINUE SAME, WILL REEVALUATE AT NEXT VISIT     Orders:  -     CBC (No Diff)    4. High cholesterol  Assessment & Plan:   Lipid abnormalities are stable    Plan:  Continue same medication/s without change.      Discussed medication dosage, use, side effects, and goals of treatment in detail.    Counseled patient on lifestyle modifications to help control hyperlipidemia.     Patient Treatment Goals:   LDL goal is less than 70    Followup in 6 months.    Orders:  -     Comprehensive Metabolic Panel  -     Lipid Panel    5. Screening for prostate cancer  -     PSA Screen    6. Dizziness (brief, intermittent)  Assessment & Plan:  IMPROVED WITH CURRENT TREATMENT, CONTINUE SAME, WILL REEVALUATE AT NEXT VISIT   SEE ENT AS PLANNED      7. Toe pain, intermittent, right fourth  Assessment & Plan:  OBSERVE FOR NOW.  F/U WITH PODIATRY IF PERSISTES OR WORSENS.                 Follow Up   Return in about 6 months (around 10/24/2024).  Patient was given instructions and counseling regarding his condition or for health maintenance advice. Please see specific information pulled into the AVS if appropriate.

## 2024-04-24 NOTE — ASSESSMENT & PLAN NOTE
IMPROVED WITH CURRENT TREATMENT, CONTINUE SAME, WILL REEVALUATE AT NEXT VISIT   SEE ENT AS PLANNED

## 2024-04-30 RX ORDER — CLOPIDOGREL BISULFATE 75 MG/1
TABLET ORAL
Qty: 90 TABLET | Refills: 1 | Status: SHIPPED | OUTPATIENT
Start: 2024-04-30

## 2024-05-09 RX ORDER — ATORVASTATIN CALCIUM 80 MG/1
80 TABLET, FILM COATED ORAL DAILY
Qty: 90 TABLET | Refills: 0 | Status: SHIPPED | OUTPATIENT
Start: 2024-05-09

## 2024-05-23 ENCOUNTER — PATIENT ROUNDING (BHMG ONLY) (OUTPATIENT)
Dept: OTOLARYNGOLOGY | Facility: CLINIC | Age: 69
End: 2024-05-23

## 2024-05-23 ENCOUNTER — OFFICE VISIT (OUTPATIENT)
Dept: OTOLARYNGOLOGY | Facility: CLINIC | Age: 69
End: 2024-05-23
Payer: MEDICARE

## 2024-05-23 VITALS
TEMPERATURE: 98 F | BODY MASS INDEX: 28.97 KG/M2 | WEIGHT: 195.6 LBS | HEIGHT: 69 IN | HEART RATE: 53 BPM | DIASTOLIC BLOOD PRESSURE: 81 MMHG | SYSTOLIC BLOOD PRESSURE: 171 MMHG

## 2024-05-23 DIAGNOSIS — H91.93 HEARING DIFFICULTY, BILATERAL: Primary | ICD-10-CM

## 2024-05-23 DIAGNOSIS — H66.006 RECURRENT ACUTE SUPPURATIVE OTITIS MEDIA WITHOUT SPONTANEOUS RUPTURE OF TYMPANIC MEMBRANE OF BOTH SIDES: ICD-10-CM

## 2024-05-23 NOTE — PROGRESS NOTES
A [employer] message has been send to the patient for PATIENT R[place]ING [place] [facility]

## 2024-05-23 NOTE — PROGRESS NOTES
Patient Name: Duane Hemphill   Visit Date: 05/23/2024   Patient ID: [id number]  Provider: Mert Vale MD    Sex: male  Location: [facility]   YOB: 1955  Location Address: [address] [place] [address], [address] [place] [address],?[phone]?[id number]    Primary Care Provider Ricky Hinton MD  Location Phone: [phone]    Referring Provider: Ricky Hinton,*        Chief Complaint  Hearing Loss (New Patient )    Subjective    History of Present Illness  Duane Hemphill is a 68 y.o. male who presents to [facility] today as a consult from Ricky Hinton,*.    He presents to the clinic today for evaluation of his ears and hearing.  He informs me that he has had a longstanding sensorineural hearing loss for many years.  He has a long history of being around loud noise and does not always wear hearing protection.  He has hearing aids that he purchased from [facility], who does his regular follow-ups and just recently saw him several weeks ago.  He has not had a hearing test in about a year.  He notes that in general his hearing is stable, and hearing aids work well for him.    He has not had any ear infection issues recently, but did have some difficulties for a few years several years ago.  Denies any pressure or pain complaints now.    Past Medical History:   Diagnosis Date    Chronic low back pain     Fracture, femur     HISTORY OF WITH HARDWARE    Hearing loss     Hypertension     AT TIMES. NEVER ON ANY MEDS    Nerve compression     Sciatic leg pain     PAIN RADIATING DOWN RIGHT LEG       Past Surgical History:   Procedure Laterality Date    APPENDECTOMY      CAROTID ENDARTERECTOMY Right     CHOLECYSTECTOMY      COLONOSCOPY  2018    NORMAL    FEMUR FRACTURE SURGERY Right     WITH HARDWARE    INGUINAL HERNIA REPAIR Bilateral     LUMBAR DISCECTOMY FUSION INSTRUMENTATION Bilateral 12/17/2019    Procedure: LUMBAR 4 TO LUMBAR 5 OPEN DECOMPRESSION AND  "POSTERIOR LUMBAR INTERBODY FUSION WITH CAGE AND SEXTANT PEDICLE SCREW FIXATION;  Surgeon: Saurav Rasheed MD;  Location: [facility] OR;  Service: Neurosurgery    LUMBAR LAMINECTOMY DISCECTOMY DECOMPRESSION Right 10/04/2016    Procedure: RIGHT L4-5 LUMBAR DECOMPRESSION ;  Surgeon: Saurav Rasheed MD;  Location: [facility] OR;  Service:     VASECTOMY           Current Outpatient Medications:     aspirin 81 MG EC tablet, Take 1 tablet by mouth Daily., Disp: , Rfl:     atorvastatin (LIPITOR) 80 MG tablet, TAKE 1 TABLET BY MOUTH ONCE DAILY, Disp: 90 tablet, Rfl: 0    clopidogrel (PLAVIX) 75 MG tablet, TAKE 1 TABLET BY MOUTH ONCE DAILY, Disp: 90 tablet, Rfl: 1    lisinopril (PRINIVIL,ZESTRIL) 20 MG tablet, TAKE 1 TABLET BY MOUTH ONCE DAILY, Disp: 90 tablet, Rfl: 1    pantoprazole (PROTONIX) 40 MG EC tablet, Take 1 tablet by mouth As Needed. (Patient not taking: Reported on 5/23/2024), Disp: , Rfl:      No Known Allergies    Family History   Problem Relation Age of Onset    COPD Mother     Cancer Father     Malig Hyperthermia Neg Hx         Social History     Social History Narrative    Not on file       Objective     Vital Signs:   /81 (BP Location: Left arm, Patient Position: Sitting, Cuff Size: Adult)   Pulse 53   Temp 98 °F (36.7 °C) (Tympanic)   Ht 175.3 cm (69\")   Wt 88.7 kg (195 lb 9.6 oz)   BMI 28.89 kg/m²       Physical Exam    Constitutional   Appearance  : well developed, well-nourished, alert and in no acute distress, voice clear and strong    Head  Inspection  : no deformities or lesions  Face  Inspection  : No facial lesions; House-Brackmann I/VI bilaterally  Palpation  : No TMJ crepitus nor  muscle tenderness bilaterally    Eyes  Vision  Visual Fields  : Extraocular movements are intact. No spontaneous or gaze-induced nystagmus.  Conjunctivae  : clear  Sclerae  : clear  Pupils and Irises  : pupils equal, round, and reactive to light.     Ears, Nose, Mouth and " Throat    Ears    External Ears  : appearance within normal limits, no lesions present  Otoscopic Examination  : Tympanic membrane appearance within normal limits bilaterally without perforations, well-aerated middle ears  Hearing  : intact to conversational voice both ears, better with hearing aids  Tunning fork testing:     :    Nose    External Nose  : appearance normal  Intranasal Exam  : mucosa within normal limits, vestibules normal, no intranasal lesions present, septum midline, sinuses non tender to percussion  Oral Cavity    Oral Mucosa  : oral mucosa normal without pallor or cyanosis  Lips  : lip appearance normal  Teeth  : normal dentition for age  Gums  : gums pink, non-swollen, no bleeding present  Tongue  : tongue appearance normal; normal mobility  Palate  : hard palate normal, soft palate appearance normal with symmetric mobility    Throat    Oropharynx  : no inflammation or lesions present, tonsils within normal limits  Hypopharynx  : appearance within normal limits, superior epiglottis within normal limits  Larynx  : appearance within normal limits, vocal cords within normal limits, no lesions present    Neck  Inspection/Palpation  : normal appearance, no masses or tenderness, trachea midline; thyroid size normal, nontender, no nodules or masses present on palpation    Respiratory  Respiratory Effort  : breathing unlabored  Inspection of Chest  : normal appearance, no retractions    Cardiovascular  Heart  : regular rate and rhythm    Lymphatic  Neck  : no lymphadenopathy present  Supraclavicular Nodes  : no lymphadenopathy present  Preauricular Nodes  : no lymphadenopathy present    Skin and Subcutaneous Tissue  General Inspection  : Regarding face and neck - there are no rashes present, no lesions present, and no areas of discoloration    Neurologic  Cranial Nerves  : cranial nerves II-XII are grossly intact bilaterally  Gait and Station  : normal gait, able to stand without  diffculty    Psychiatric  Judgement and Insight  : judgment and insight intact  Mood and Affect  : mood normal, affect appropriate              Assessment and Plan    Diagnoses and all orders for this visit:    1. Hearing difficulty, bilateral (Primary)    2. Recurrent acute suppurative otitis media without spontaneous rupture of tympanic membrane of both sides    Examination today was normal and his ears appear healthy.  He likely has a sensorineural underlying hearing loss, and have asked that he bring in his most recent audiogram or have his hearing retested with his audiologist as they may need to adjust his hearing aids.  I will be glad to see him back in the clinic to discuss his audiogram results and if he has any further issues.    Follow Up   No follow-ups on file.  Patient was given instructions and counseling regarding his condition or for health maintenance advice. Please see specific information pulled into the AVS if appropriate.

## 2024-07-19 ENCOUNTER — OFFICE VISIT (OUTPATIENT)
Dept: FAMILY MEDICINE CLINIC | Age: 69
End: 2024-07-19
Payer: MEDICARE

## 2024-07-19 VITALS
TEMPERATURE: 97.6 F | BODY MASS INDEX: 28.68 KG/M2 | HEART RATE: 53 BPM | OXYGEN SATURATION: 97 % | WEIGHT: 193.6 LBS | HEIGHT: 69 IN | DIASTOLIC BLOOD PRESSURE: 82 MMHG | SYSTOLIC BLOOD PRESSURE: 127 MMHG

## 2024-07-19 DIAGNOSIS — R05.2 SUBACUTE COUGH: ICD-10-CM

## 2024-07-19 DIAGNOSIS — R10.30 LOWER ABDOMINAL PAIN: Primary | ICD-10-CM

## 2024-07-19 DIAGNOSIS — R14.0 ABDOMINAL BLOATING: ICD-10-CM

## 2024-07-19 DIAGNOSIS — R19.7 DIARRHEA, UNSPECIFIED TYPE: ICD-10-CM

## 2024-07-19 PROCEDURE — 3079F DIAST BP 80-89 MM HG: CPT

## 2024-07-19 PROCEDURE — 99213 OFFICE O/P EST LOW 20 MIN: CPT

## 2024-07-19 PROCEDURE — 3074F SYST BP LT 130 MM HG: CPT

## 2024-07-19 NOTE — PROGRESS NOTES
"Subjective     CHIEF COMPLAINT    Chief Complaint   Patient presents with    Abdominal Pain     Bloating, x 1 month. Sx worsened the last 2 weeks.     Diarrhea     X 2-3 days.     Cough     History of Present Illness  Patient seen during computer downtime, this encounter is being charted at a later date.    Patient is a 68-year-old male, presenting with his wife.  He is here today with complaints of abdominal bloating, diarrhea and indigestion.  Also notes some lower abdominal pain.  States symptoms have been present for about 2 weeks but his wife states symptoms have been present for over a month.  He notes some nausea but no vomiting.  Per patient, his stomach feels hard.  No fever or chills.  He has had a cholecystectomy.  No recent changes to diet, no recent antibiotic use and no recent travel.  Denies any recent significant weight loss.  He states he is having about 2-3 bowel movements a day that are liquid.  He has been taking Maalox recently which seems to help.  Denies any blood in his stool.  He has not recently had a colonoscopy.  States he is burping a lot and passing a lot of gas.  He is prescribed pantoprazole but is only taking it as needed and states he has been not taking it in several months.    Additionally, he complains of a cough for about a month.  Cough is productive of white phlegm.  Symptoms have been present a month.  States that once he coughs he feels fine but just feels like he has stuff to cough up.  States that he will hear wheezing \"at times\" he is a former smoker of cigars.  No other upper respiratory symptoms including congestion, sore throat, runny nose.  Denies any leg swelling.  Denies any chest pain or shortness of breath.    Review of Systems   Constitutional:  Negative for chills and fever.   HENT:  Negative for congestion, rhinorrhea and sore throat.    Respiratory:  Positive for cough and wheezing (\"At times\"). Negative for shortness of breath.    Cardiovascular:  Negative " for chest pain and leg swelling.   Gastrointestinal:  Positive for abdominal distention, abdominal pain, diarrhea and nausea. Negative for blood in stool, constipation and vomiting.       Past Medical History:   Diagnosis Date    Chronic low back pain     Fracture, femur     HISTORY OF WITH HARDWARE    Hearing loss     Hypertension     AT TIMES. NEVER ON ANY MEDS    Nerve compression     Sciatic leg pain     PAIN RADIATING DOWN RIGHT LEG            Past Surgical History:   Procedure Laterality Date    APPENDECTOMY      CAROTID ENDARTERECTOMY Right     CHOLECYSTECTOMY      COLONOSCOPY  2018    NORMAL    FEMUR FRACTURE SURGERY Right     WITH HARDWARE    INGUINAL HERNIA REPAIR Bilateral     LUMBAR DISCECTOMY FUSION INSTRUMENTATION Bilateral 2019    Procedure: LUMBAR 4 TO LUMBAR 5 OPEN DECOMPRESSION AND POSTERIOR LUMBAR INTERBODY FUSION WITH CAGE AND SEXTANT PEDICLE SCREW FIXATION;  Surgeon: Saurav Rasheed MD;  Location: [facility];  Service: Neurosurgery    LUMBAR LAMINECTOMY DISCECTOMY DECOMPRESSION Right 10/04/2016    Procedure: RIGHT L4-5 LUMBAR DECOMPRESSION ;  Surgeon: Saurav Rasheed MD;  Location: [facility];  Service:     VASECTOMY              Family History   Problem Relation Age of Onset    COPD Mother     Cancer Father     Malig Hyperthermia Neg Hx             Social History     Socioeconomic History    Marital status:    Tobacco Use    Smoking status: Former     Types: Cigars     Quit date:      Years since quittin.5    Smokeless tobacco: Never    Tobacco comments:     CHEWS ON A CIGAR   Vaping Use    Vaping status: Never Used   Substance and Sexual Activity    Alcohol use: Yes     Comment: AN OCC GLASS OF BEER    Drug use: No    Sexual activity: Defer            No Known Allergies         Current Outpatient Medications on File Prior to Visit   Medication Sig Dispense Refill    aspirin 81 MG EC tablet Take 1 tablet by mouth Daily.      atorvastatin (LIPITOR) 80 MG tablet  "TAKE 1 TABLET BY MOUTH ONCE DAILY 90 tablet 0    clopidogrel (PLAVIX) 75 MG tablet TAKE 1 TABLET BY MOUTH ONCE DAILY 90 tablet 1    lisinopril (PRINIVIL,ZESTRIL) 20 MG tablet TAKE 1 TABLET BY MOUTH ONCE DAILY 90 tablet 1    pantoprazole (PROTONIX) 40 MG EC tablet Take 1 tablet by mouth As Needed.       No current facility-administered medications on file prior to visit.     /82   Pulse 53   Temp 97.6 °F (36.4 °C) (Oral)   Ht 175.3 cm (69.02\")   Wt 87.8 kg (193 lb 9.6 oz)   SpO2 97% Comment: room air  BMI 28.58 kg/m²     Objective     Physical Exam  Vitals and nursing note reviewed.   Constitutional:       General: He is not in acute distress.     Appearance: Normal appearance. He is not ill-appearing.   HENT:      Head: Normocephalic.      Nose: Nose normal.      Mouth/Throat:      Lips: Pink.      Mouth: Mucous membranes are moist.   Eyes:      Extraocular Movements: Extraocular movements intact.      Pupils: Pupils are equal, round, and reactive to light.   Cardiovascular:      Rate and Rhythm: Normal rate and regular rhythm.      Heart sounds: Normal heart sounds. No murmur heard.  Pulmonary:      Effort: Pulmonary effort is normal. No accessory muscle usage or respiratory distress.      Breath sounds: Normal breath sounds. No wheezing or rhonchi.   Abdominal:      General: Bowel sounds are normal. There is no distension.      Palpations: Abdomen is soft.      Tenderness: There is no abdominal tenderness. There is no guarding or rebound.   Musculoskeletal:      Right lower leg: No edema.      Left lower leg: No edema.   Skin:     General: Skin is warm and dry.   Neurological:      General: No focal deficit present.      Mental Status: He is alert and oriented to person, place, and time.   Psychiatric:         Mood and Affect: Mood and affect normal.         Behavior: Behavior normal.          Assessment & Plan  Lower abdominal pain  Abdomen is not acutely tender on exam today.  Symptoms been present " for at least 2 weeks to a month.  Will check some labs as noted below.  I gave patient a paper order for these labs at visit and then when computer downtime had resolved I placed orders into the computer.  Further plan pending lab results but may require referral to GI for further evaluation. May benefit from pantoprazole routinely, but will hold off on this until after he has completed h pylori testing.   Abdominal bloating  As per lower abdominal pain plan  Diarrhea, unspecified type  Declined stool studies, Maalox seems to be helping. Checking labs as noted.   Subacute cough  Lungs are clear on exam today.  No evidence of bacterial infection.  Recommended chest x-ray.  Again I gave patient a paper order for this at visit and then placed order in the computer when downtime had resolved.  Suspect possibly allergic in nature.  Further plan pending x-ray result    Orders Placed This Encounter   Procedures    H. Pylori Breath Test - Breath, Lung    XR Chest PA & Lateral    Comprehensive metabolic panel    Lipase    CBC w AUTO Differential        Patient will return next week when lab and radiology are back open (closed due to computer downtown) to complete orders. Instructed patient on ER and return precautions and he voiced understanding.     Follow up:  Return if symptoms worsen or fail to improve.  Patient was given instructions and counseling regarding his condition or for health maintenance advice. Please see specific information pulled into the AVS if appropriate.

## 2024-07-22 ENCOUNTER — LAB (OUTPATIENT)
Dept: LAB | Facility: HOSPITAL | Age: 69
End: 2024-07-22
Payer: MEDICARE

## 2024-07-22 ENCOUNTER — HOSPITAL ENCOUNTER (OUTPATIENT)
Dept: GENERAL RADIOLOGY | Facility: HOSPITAL | Age: 69
Discharge: HOME | End: 2024-07-22
Payer: MEDICARE

## 2024-07-22 DIAGNOSIS — R19.7 DIARRHEA, UNSPECIFIED TYPE: ICD-10-CM

## 2024-07-22 DIAGNOSIS — R14.0 ABDOMINAL BLOATING: ICD-10-CM

## 2024-07-22 DIAGNOSIS — R05.2 SUBACUTE COUGH: ICD-10-CM

## 2024-07-22 LAB
ALBUMIN SERPL-MCNC: 4.7 G/DL (ref 3.5–5.2)
ALBUMIN/GLOB SERPL: 1.8 G/DL
ALP SERPL-CCNC: 143 U/L (ref 39–117)
ALT SERPL W P-5'-P-CCNC: 38 U/L (ref 1–41)
ANION GAP SERPL CALCULATED.3IONS-SCNC: 11.8 MMOL/L (ref 5–15)
AST SERPL-CCNC: 37 U/L (ref 1–40)
BASOPHILS # BLD AUTO: 0.03 10*3/MM3 (ref 0–0.2)
BASOPHILS NFR BLD AUTO: 0.4 % (ref 0–1.5)
BILIRUB SERPL-MCNC: 1.3 MG/DL (ref 0–1.2)
BUN SERPL-MCNC: 27 MG/DL (ref 8–23)
BUN/CREAT SERPL: 14.3 (ref 7–25)
CALCIUM SPEC-SCNC: 10.1 MG/DL (ref 8.6–10.5)
CHLORIDE SERPL-SCNC: 102 MMOL/L (ref 98–107)
CO2 SERPL-SCNC: 22.2 MMOL/L (ref 22–29)
CREAT SERPL-MCNC: 1.89 MG/DL (ref 0.76–1.27)
DEPRECATED RDW RBC AUTO: 43.7 FL (ref 37–54)
EGFRCR SERPLBLD CKD-EPI 2021: 37.7 ML/MIN/1.73
EOSINOPHIL # BLD AUTO: 0.4 10*3/MM3 (ref 0–0.4)
EOSINOPHIL NFR BLD AUTO: 5.8 % (ref 0.3–6.2)
ERYTHROCYTE [DISTWIDTH] IN BLOOD BY AUTOMATED COUNT: 12.2 % (ref 12.3–15.4)
GLOBULIN UR ELPH-MCNC: 2.6 GM/DL
GLUCOSE SERPL-MCNC: 98 MG/DL (ref 65–99)
HCT VFR BLD AUTO: 49.8 % (ref 37.5–51)
HGB BLD-MCNC: 17.2 G/DL (ref 13–17.7)
IMM GRANULOCYTES # BLD AUTO: 0.02 10*3/MM3 (ref 0–0.05)
IMM GRANULOCYTES NFR BLD AUTO: 0.3 % (ref 0–0.5)
LIPASE SERPL-CCNC: 46 U/L (ref 13–60)
LYMPHOCYTES # BLD AUTO: 1.98 10*3/MM3 (ref 0.7–3.1)
LYMPHOCYTES NFR BLD AUTO: 28.6 % (ref 19.6–45.3)
MCH RBC QN AUTO: 33 PG (ref 26.6–33)
MCHC RBC AUTO-ENTMCNC: 34.5 G/DL (ref 31.5–35.7)
MCV RBC AUTO: 95.4 FL (ref 79–97)
MONOCYTES # BLD AUTO: 0.89 10*3/MM3 (ref 0.1–0.9)
MONOCYTES NFR BLD AUTO: 12.8 % (ref 5–12)
NEUTROPHILS NFR BLD AUTO: 3.61 10*3/MM3 (ref 1.7–7)
NEUTROPHILS NFR BLD AUTO: 52.1 % (ref 42.7–76)
PLATELET # BLD AUTO: 288 10*3/MM3 (ref 140–450)
PMV BLD AUTO: 10.5 FL (ref 6–12)
POTASSIUM SERPL-SCNC: 5.2 MMOL/L (ref 3.5–5.2)
PROT SERPL-MCNC: 7.3 G/DL (ref 6–8.5)
RBC # BLD AUTO: 5.22 10*6/MM3 (ref 4.14–5.8)
SODIUM SERPL-SCNC: 136 MMOL/L (ref 136–145)
WBC NRBC COR # BLD AUTO: 6.93 10*3/MM3 (ref 3.4–10.8)

## 2024-07-22 PROCEDURE — 80053 COMPREHEN METABOLIC PANEL: CPT

## 2024-07-22 PROCEDURE — 71046 X-RAY EXAM CHEST 2 VIEWS: CPT

## 2024-07-22 PROCEDURE — 83690 ASSAY OF LIPASE: CPT

## 2024-07-22 PROCEDURE — 83013 H PYLORI (C-13) BREATH: CPT

## 2024-07-22 PROCEDURE — 85025 COMPLETE CBC W/AUTO DIFF WBC: CPT

## 2024-07-22 PROCEDURE — 36415 COLL VENOUS BLD VENIPUNCTURE: CPT

## 2024-07-23 DIAGNOSIS — R74.8 ELEVATED LIVER ENZYMES: Primary | ICD-10-CM

## 2024-07-23 DIAGNOSIS — R94.4 DECREASED GFR: ICD-10-CM

## 2024-07-23 LAB — UREA BREATH TEST QL: NEGATIVE

## 2024-07-24 RX ORDER — LISINOPRIL 20 MG/1
20 TABLET ORAL DAILY
Qty: 90 TABLET | Refills: 0 | Status: SHIPPED | OUTPATIENT
Start: 2024-07-24

## 2024-07-29 ENCOUNTER — LAB (OUTPATIENT)
Dept: LAB | Facility: HOSPITAL | Age: 69
End: 2024-07-29
Payer: MEDICARE

## 2024-07-29 ENCOUNTER — OFFICE VISIT (OUTPATIENT)
Dept: FAMILY MEDICINE CLINIC | Age: 69
End: 2024-07-29
Payer: MEDICARE

## 2024-07-29 VITALS
BODY MASS INDEX: 28.53 KG/M2 | SYSTOLIC BLOOD PRESSURE: 146 MMHG | HEIGHT: 69 IN | WEIGHT: 192.6 LBS | DIASTOLIC BLOOD PRESSURE: 89 MMHG | TEMPERATURE: 98.2 F

## 2024-07-29 DIAGNOSIS — I10 ESSENTIAL HYPERTENSION: ICD-10-CM

## 2024-07-29 DIAGNOSIS — E78.00 HIGH CHOLESTEROL: ICD-10-CM

## 2024-07-29 DIAGNOSIS — N18.31 STAGE 3A CHRONIC KIDNEY DISEASE: ICD-10-CM

## 2024-07-29 DIAGNOSIS — K21.9 GERD WITHOUT ESOPHAGITIS: Primary | ICD-10-CM

## 2024-07-29 PROBLEM — Z00.00 MEDICARE ANNUAL WELLNESS VISIT, SUBSEQUENT: Status: RESOLVED | Noted: 2024-04-24 | Resolved: 2024-07-29

## 2024-07-29 LAB
ALBUMIN SERPL-MCNC: 4.8 G/DL (ref 3.5–5.2)
ALBUMIN/GLOB SERPL: 1.6 G/DL
ALP SERPL-CCNC: 144 U/L (ref 39–117)
ALT SERPL W P-5'-P-CCNC: 44 U/L (ref 1–41)
ANION GAP SERPL CALCULATED.3IONS-SCNC: 12.1 MMOL/L (ref 5–15)
AST SERPL-CCNC: 36 U/L (ref 1–40)
BASOPHILS # BLD AUTO: 0.1 10*3/MM3 (ref 0–0.2)
BASOPHILS NFR BLD AUTO: 1.2 % (ref 0–1.5)
BILIRUB SERPL-MCNC: 0.8 MG/DL (ref 0–1.2)
BUN SERPL-MCNC: 19 MG/DL (ref 8–23)
BUN/CREAT SERPL: 13.6 (ref 7–25)
CALCIUM SPEC-SCNC: 9.6 MG/DL (ref 8.6–10.5)
CHLORIDE SERPL-SCNC: 100 MMOL/L (ref 98–107)
CO2 SERPL-SCNC: 22.9 MMOL/L (ref 22–29)
CREAT SERPL-MCNC: 1.4 MG/DL (ref 0.76–1.27)
DEPRECATED RDW RBC AUTO: 43.1 FL (ref 37–54)
EGFRCR SERPLBLD CKD-EPI 2021: 54.1 ML/MIN/1.73
EOSINOPHIL # BLD AUTO: 0.45 10*3/MM3 (ref 0–0.4)
EOSINOPHIL NFR BLD AUTO: 5.4 % (ref 0.3–6.2)
ERYTHROCYTE [DISTWIDTH] IN BLOOD BY AUTOMATED COUNT: 12.5 % (ref 12.3–15.4)
GLOBULIN UR ELPH-MCNC: 3 GM/DL
GLUCOSE SERPL-MCNC: 85 MG/DL (ref 65–99)
HCT VFR BLD AUTO: 48.7 % (ref 37.5–51)
HGB BLD-MCNC: 16.7 G/DL (ref 13–17.7)
IMM GRANULOCYTES # BLD AUTO: 0.03 10*3/MM3 (ref 0–0.05)
IMM GRANULOCYTES NFR BLD AUTO: 0.4 % (ref 0–0.5)
LYMPHOCYTES # BLD AUTO: 2.19 10*3/MM3 (ref 0.7–3.1)
LYMPHOCYTES NFR BLD AUTO: 26.5 % (ref 19.6–45.3)
MCH RBC QN AUTO: 32.5 PG (ref 26.6–33)
MCHC RBC AUTO-ENTMCNC: 34.3 G/DL (ref 31.5–35.7)
MCV RBC AUTO: 94.7 FL (ref 79–97)
MONOCYTES # BLD AUTO: 0.78 10*3/MM3 (ref 0.1–0.9)
MONOCYTES NFR BLD AUTO: 9.4 % (ref 5–12)
NEUTROPHILS NFR BLD AUTO: 4.72 10*3/MM3 (ref 1.7–7)
NEUTROPHILS NFR BLD AUTO: 57.1 % (ref 42.7–76)
NRBC BLD AUTO-RTO: 0 /100 WBC (ref 0–0.2)
PLATELET # BLD AUTO: 308 10*3/MM3 (ref 140–450)
PMV BLD AUTO: 11.2 FL (ref 6–12)
POTASSIUM SERPL-SCNC: 4.6 MMOL/L (ref 3.5–5.2)
PROT SERPL-MCNC: 7.8 G/DL (ref 6–8.5)
RBC # BLD AUTO: 5.14 10*6/MM3 (ref 4.14–5.8)
SODIUM SERPL-SCNC: 135 MMOL/L (ref 136–145)
WBC NRBC COR # BLD AUTO: 8.27 10*3/MM3 (ref 3.4–10.8)

## 2024-07-29 PROCEDURE — 85025 COMPLETE CBC W/AUTO DIFF WBC: CPT | Performed by: FAMILY MEDICINE

## 2024-07-29 PROCEDURE — 99214 OFFICE O/P EST MOD 30 MIN: CPT | Performed by: FAMILY MEDICINE

## 2024-07-29 PROCEDURE — 1160F RVW MEDS BY RX/DR IN RCRD: CPT | Performed by: FAMILY MEDICINE

## 2024-07-29 PROCEDURE — 3077F SYST BP >= 140 MM HG: CPT | Performed by: FAMILY MEDICINE

## 2024-07-29 PROCEDURE — 1159F MED LIST DOCD IN RCRD: CPT | Performed by: FAMILY MEDICINE

## 2024-07-29 PROCEDURE — 3079F DIAST BP 80-89 MM HG: CPT | Performed by: FAMILY MEDICINE

## 2024-07-29 PROCEDURE — 80053 COMPREHEN METABOLIC PANEL: CPT | Performed by: FAMILY MEDICINE

## 2024-07-29 PROCEDURE — G2211 COMPLEX E/M VISIT ADD ON: HCPCS | Performed by: FAMILY MEDICINE

## 2024-07-29 PROCEDURE — 36415 COLL VENOUS BLD VENIPUNCTURE: CPT | Performed by: FAMILY MEDICINE

## 2024-07-29 NOTE — ASSESSMENT & PLAN NOTE
PROBABLY THE SOURCE OF HIS SYMPTOMS.  ADVISED TO TAKE THE PPI DAILY ALSONG WITH PRN ANTACIDS AND TO REDUCE HIS INTAKE OF CAFFEINE AND ALCOHOL.  CONSIDER CT--U/S AND EGD.  GO TO THE ER IF WORSE IN THE MEANTIME.  WILL REPEAT LABS AS NOTED

## 2024-07-29 NOTE — PROGRESS NOTES
Chief Complaint  Diarrhea (Upset stomach) and Follow-up (On recent labs)    Subjective          Duane Hemphill presents to [facility]  History of Present Illness  --TOLERATING BLOOD PRESSURE MEDICATION WITHOUT APPARENT SIDE EFFECTS  --LAST LIPIDS WERE OK, NO ISSUES WITH THE STATIN  --HAS A HISTORY OF GERD FOR WHICH HE HAS BEEN TAKING A PPI AND ANTACIDS ON A PRN BASIS ONLY, ALSO DRINKS A FAIR AMOUNT OF CAFFEINE AND A MODERATE AMOUNT OF ALCOHOL.  FOR THE PAST MONTH HE HAS HAD A FAIR AMOUNT OF RUMBLING IN HIS STOMACH AND A DECREASED APPETITE.  SHORT TERM IMPROVEMENT WITH BELCHING, PASSING GAS AND TAKING MAALOX.  SOME DIARRHEA AND BM'S HAVE BEEN DARK AT TIMES.  NO N / V OR DYSPHAGIA.  HE IS S/P A CHOLECYSTECTOMY SEVERAL YEARS AGO.  NO CARDIVASCULAR OR URINARY SYMPTOMS.    --GFR HAS DECREASED FROM 49 TO 37        No Known Allergies     Health Maintenance Due   Topic Date Due    COVID-19 Vaccine (4 - 2023-24 season) 02/19/2024        Current Outpatient Medications on File Prior to Visit   Medication Sig    aspirin 81 MG EC tablet Take 1 tablet by mouth Daily.    atorvastatin (LIPITOR) 80 MG tablet TAKE 1 TABLET BY MOUTH ONCE DAILY    clopidogrel (PLAVIX) 75 MG tablet TAKE 1 TABLET BY MOUTH ONCE DAILY    lisinopril (PRINIVIL,ZESTRIL) 20 MG tablet TAKE 1 TABLET BY MOUTH ONCE DAILY    pantoprazole (PROTONIX) 40 MG EC tablet Take 1 tablet by mouth As Needed.     No current facility-administered medications on file prior to visit.       Immunization History   Administered Date(s) Administered    COVID-19 (ADAIR) 05/21/2021, 11/11/2021    COVID-19 F23 (PFIZER) 12YRS+ (COMIRNATY) 10/19/2023    FLUAD TRI 65YR+ 11/14/2019    Fluad Quad 65+ 09/29/2022, 10/17/2023    Fluzone High-Dose 65+yrs 12/02/2020    Influenza, Unspecified 11/11/2021    Pneumococcal Conjugate 20-Valent (PCV20) 09/28/2023    Shingrix 01/12/2021, 01/26/2022    Tdap 08/09/2011       Review of Systems   Constitutional:  Negative  "for activity change, chills, fatigue and fever.   HENT:  Negative for congestion, ear pain, rhinorrhea and sore throat.    Respiratory:  Negative for cough and shortness of breath.    Cardiovascular:  Negative for chest pain, palpitations and leg swelling.   Gastrointestinal:  Negative for constipation, nausea and vomiting.   Musculoskeletal:  Negative for arthralgias and myalgias.   Neurological:  Negative for headache.        Objective     /89 (BP Location: Left arm, Patient Position: Sitting)   Temp 98.2 °F (36.8 °C) (Oral)   Ht 175.3 cm (69\")   Wt 87.4 kg (192 lb 9.6 oz)   BMI 28.44 kg/m²       Physical Exam  Vitals and nursing note reviewed.   Constitutional:       General: He is not in acute distress.     Appearance: Normal appearance.   Cardiovascular:      Rate and Rhythm: Normal rate and regular rhythm.      Heart sounds: Normal heart sounds. No murmur heard.  Pulmonary:      Effort: Pulmonary effort is normal.      Breath sounds: Normal breath sounds.   Abdominal:      General: There is no distension.      Palpations: Abdomen is soft.      Tenderness: There is no abdominal tenderness. There is no guarding or rebound.      Comments: BOWEL SOUNDS ARE HYPERACTIVE    Musculoskeletal:      Cervical back: Neck supple.      Right lower leg: No edema.      Left lower leg: No edema.   Lymphadenopathy:      Cervical: No cervical adenopathy.   Neurological:      General: No focal deficit present.      Mental Status: He is alert.      Cranial Nerves: No cranial nerve deficit.      Coordination: Coordination normal.      Gait: Gait normal.   Psychiatric:         Mood and Affect: Mood normal.         Behavior: Behavior normal.         Result Review :                             Assessment and Plan      Diagnoses and all orders for this visit:    1. GERD without esophagitis (Primary)  Assessment & Plan:  PROBABLY THE SOURCE OF HIS SYMPTOMS.  ADVISED TO TAKE THE PPI DAILY ALSONG WITH PRN ANTACIDS AND TO REDUCE " HIS INTAKE OF CAFFEINE AND ALCOHOL.  CONSIDER CT--U/S AND EGD.  GO TO THE ER IF WORSE IN THE MEANTIME.  WILL REPEAT LABS AS NOTED     Orders:  -     CBC & Differential  -     Comprehensive Metabolic Panel    2. Essential hypertension  Assessment & Plan:  Hypertension is stable and controlled  Continue current treatment regimen.  Blood pressure will be reassessed in 6 months.      3. High cholesterol  Assessment & Plan:   Lipid abnormalities are stable    Plan:  Continue same medication/s without change.      Discussed medication dosage, use, side effects, and goals of treatment in detail.    Counseled patient on lifestyle modifications to help control hyperlipidemia.     Patient Treatment Goals:   LDL goal is less than 70    Followup in 6 months.      4. Stage 3a chronic kidney disease  Assessment & Plan:  PROBABLY ONLY A TEMPORARY DECREASE IN GFR, WILL RECHECK AS NOTED.                Follow Up     No follow-ups on file.    Patient was given instructions and counseling regarding his condition or for health maintenance advice. Please see specific information pulled into the AVS if appropriate.

## 2024-08-12 RX ORDER — ATORVASTATIN CALCIUM 80 MG/1
80 TABLET, FILM COATED ORAL DAILY
Qty: 90 TABLET | Refills: 0 | Status: SHIPPED | OUTPATIENT
Start: 2024-08-12

## 2024-10-21 RX ORDER — LISINOPRIL 20 MG/1
20 TABLET ORAL DAILY
Qty: 90 TABLET | Refills: 0 | Status: SHIPPED | OUTPATIENT
Start: 2024-10-21

## 2024-10-25 ENCOUNTER — OFFICE VISIT (OUTPATIENT)
Dept: FAMILY MEDICINE CLINIC | Age: 69
End: 2024-10-25
Payer: MEDICARE

## 2024-10-25 VITALS
HEART RATE: 60 BPM | OXYGEN SATURATION: 100 % | TEMPERATURE: 97.6 F | WEIGHT: 190.6 LBS | DIASTOLIC BLOOD PRESSURE: 83 MMHG | SYSTOLIC BLOOD PRESSURE: 138 MMHG | BODY MASS INDEX: 28.15 KG/M2

## 2024-10-25 DIAGNOSIS — I10 ESSENTIAL HYPERTENSION: Primary | ICD-10-CM

## 2024-10-25 DIAGNOSIS — I87.2 VENOUS INSUFFICIENCY: ICD-10-CM

## 2024-10-25 DIAGNOSIS — E78.00 HIGH CHOLESTEROL: ICD-10-CM

## 2024-10-25 DIAGNOSIS — K21.9 GERD WITHOUT ESOPHAGITIS: ICD-10-CM

## 2024-10-25 PROCEDURE — G2211 COMPLEX E/M VISIT ADD ON: HCPCS | Performed by: FAMILY MEDICINE

## 2024-10-25 PROCEDURE — 1159F MED LIST DOCD IN RCRD: CPT | Performed by: FAMILY MEDICINE

## 2024-10-25 PROCEDURE — 99214 OFFICE O/P EST MOD 30 MIN: CPT | Performed by: FAMILY MEDICINE

## 2024-10-25 PROCEDURE — 1160F RVW MEDS BY RX/DR IN RCRD: CPT | Performed by: FAMILY MEDICINE

## 2024-10-25 PROCEDURE — 3075F SYST BP GE 130 - 139MM HG: CPT | Performed by: FAMILY MEDICINE

## 2024-10-25 PROCEDURE — 3079F DIAST BP 80-89 MM HG: CPT | Performed by: FAMILY MEDICINE

## 2024-10-25 NOTE — PROGRESS NOTES
Chief Complaint  Hypertension (6m follow up/Discuss an issue with ankle swelling/) and Earache (Pt c/o (L) earache)    Subjective          Duane Hemphill presents to [facility]  History of Present Illness  --TOLERATING BLOOD PRESSURE MEDICATION WITHOUT APPARENT SIDE EFFECTS  --GERD IS DOING WELL WITH THE PPI  --LAST LIPIDS WERE OK, NO ISSUES WITH THE STATIN  --FEET AND ANKLES SWELL FROM TIME TO TIME.  OK TODAY        No Known Allergies     Health Maintenance Due   Topic Date Due    TDAP/TD VACCINES (2 - Td or Tdap) 08/09/2021        Current Outpatient Medications on File Prior to Visit   Medication Sig    aspirin 81 MG EC tablet Take 1 tablet by mouth Daily.    atorvastatin (LIPITOR) 80 MG tablet TAKE 1 TABLET BY MOUTH ONCE DAILY    clopidogrel (PLAVIX) 75 MG tablet TAKE 1 TABLET BY MOUTH ONCE DAILY    lisinopril (PRINIVIL,ZESTRIL) 20 MG tablet TAKE 1 TABLET BY MOUTH ONCE DAILY    pantoprazole (PROTONIX) 40 MG EC tablet Take 1 tablet by mouth As Needed.     No current facility-administered medications on file prior to visit.       Immunization History   Administered Date(s) Administered    COVID-19 (ADAIR) 05/21/2021, 11/11/2021    COVID-19 (PFIZER) 12YRS+ (COMIRNATY) 10/19/2023, 10/08/2024    FLUAD TRI 65YR+ 11/14/2019, 10/02/2024    Fluad Quad 65+ 09/29/2022, 10/17/2023    Fluzone (or Fluarix & Flulaval for VFC) >6mos 11/11/2021    Fluzone High-Dose 65+yrs 12/02/2020    Influenza, Unspecified 11/11/2021    Pneumococcal Conjugate 20-Valent (PCV20) 09/28/2023, 10/02/2024    Shingrix 01/12/2021, 01/26/2022    Tdap 08/09/2011       Review of Systems   Constitutional:  Negative for activity change, appetite change, chills, fatigue and fever.   HENT:  Negative for congestion, ear pain, rhinorrhea and sore throat.    Respiratory:  Negative for cough and shortness of breath.    Cardiovascular:  Negative for chest pain and palpitations.   Gastrointestinal:  Negative for abdominal pain,  constipation, diarrhea, nausea and vomiting.   Musculoskeletal:  Negative for arthralgias and myalgias.   Neurological:  Negative for headache.        Objective     /83 (BP Location: Left arm, Patient Position: Sitting)   Pulse 60   Temp 97.6 °F (36.4 °C) (Oral)   Wt 86.5 kg (190 lb 9.6 oz)   SpO2 100% Comment: room air  BMI 28.15 kg/m²       Physical Exam  Vitals and nursing note reviewed.   Constitutional:       General: He is not in acute distress.     Appearance: Normal appearance.   Cardiovascular:      Rate and Rhythm: Normal rate and regular rhythm.      Pulses:           Dorsalis pedis pulses are 2+ on the right side and 2+ on the left side.        Posterior tibial pulses are 2+ on the right side and 2+ on the left side.      Heart sounds: Normal heart sounds. No murmur heard.  Pulmonary:      Effort: Pulmonary effort is normal.      Breath sounds: Normal breath sounds.   Abdominal:      Palpations: Abdomen is soft.      Tenderness: There is no abdominal tenderness.   Musculoskeletal:      Cervical back: Neck supple.      Right lower leg: No edema.      Left lower leg: No edema.      Right foot: Normal range of motion. No deformity, bunion, Charcot foot, foot drop or prominent metatarsal heads.      Left foot: Normal range of motion. No deformity, bunion, Charcot foot, foot drop or prominent metatarsal heads.   Feet:      Right foot:      Protective Sensation: 4 sites tested.  4 sites sensed.      Skin integrity: Skin integrity normal.      Toenail Condition: Right toenails are normal.      Left foot:      Protective Sensation: 4 sites tested.  4 sites sensed.      Skin integrity: Skin integrity normal.      Toenail Condition: Left toenails are normal.      Comments: Diabetic Foot Exam Performed and Monofilament Test Performed     Lymphadenopathy:      Cervical: No cervical adenopathy.   Neurological:      General: No focal deficit present.      Mental Status: He is alert.      Cranial Nerves: No  cranial nerve deficit.      Coordination: Coordination normal.      Gait: Gait normal.   Psychiatric:         Mood and Affect: Mood normal.         Behavior: Behavior normal.         Result Review :                             Assessment and Plan      Diagnoses and all orders for this visit:    1. Essential hypertension (Primary)  Assessment & Plan:  Hypertension is stable and controlled  Continue current treatment regimen.  Blood pressure will be reassessed in 6 months.      2. GERD without esophagitis  Assessment & Plan:  IMPROVED WITH CURRENT TREATMENT, CONTINUE SAME, WILL REEVALUATE AT NEXT VISIT       3. High cholesterol  Assessment & Plan:   Lipid abnormalities are stable    Plan:  Continue same medication/s without change.      Discussed medication dosage, use, side effects, and goals of treatment in detail.    Counseled patient on lifestyle modifications to help control hyperlipidemia.     Patient Treatment Goals:   LDL goal is less than 70    Followup in 6 months.      4. Venous insufficiency  Assessment & Plan:  GENERAL ADVICE GIVEN RE:  ELEVATION, COMPRESSION HOSE, SALT RESTRICTION.  CONSIDER FURTHER W/U OR DIURETICS.                        Follow Up     Return in about 6 months (around 4/25/2025).    Patient was given instructions and counseling regarding his condition or for health maintenance advice. Please see specific information pulled into the AVS if appropriate.

## 2024-10-25 NOTE — ASSESSMENT & PLAN NOTE
GENERAL ADVICE GIVEN RE:  ELEVATION, COMPRESSION HOSE, SALT RESTRICTION.  CONSIDER FURTHER W/U OR DIURETICS.

## 2024-10-31 RX ORDER — ATORVASTATIN CALCIUM 80 MG/1
80 TABLET, FILM COATED ORAL DAILY
Qty: 90 TABLET | Refills: 0 | Status: SHIPPED | OUTPATIENT
Start: 2024-10-31

## 2024-10-31 RX ORDER — CLOPIDOGREL BISULFATE 75 MG/1
TABLET ORAL
Qty: 90 TABLET | Refills: 1 | Status: SHIPPED | OUTPATIENT
Start: 2024-10-31

## 2024-11-27 ENCOUNTER — TELEPHONE (OUTPATIENT)
Dept: FAMILY MEDICINE CLINIC | Age: 69
End: 2024-11-27
Payer: MEDICARE

## 2024-11-27 NOTE — TELEPHONE ENCOUNTER
Caller: Tk Hemphill    Relationship to patient: Emergency Contact    Best call back number: [phone]     Chief complaint: LEG SWELLING THAT IS CAUSING PAIN AND DIDN'T GO DOWN WITH COMPRESSION SOCKS    Type of visit: OFFICE    Requested date: NA     If rescheduling, when is the original appointment: 12/20     Additional notes: TK STATES SHE THINKS DR ANTOINE WOULD WANT TO SEE THE PATIENT SOONER THAN 12/20 GIVEN THE PATIENTS MEDICAL HISTORY.

## 2024-12-20 ENCOUNTER — OFFICE VISIT (OUTPATIENT)
Dept: FAMILY MEDICINE CLINIC | Age: 69
End: 2024-12-20
Payer: MEDICARE

## 2024-12-20 VITALS
OXYGEN SATURATION: 98 % | BODY MASS INDEX: 30.13 KG/M2 | SYSTOLIC BLOOD PRESSURE: 149 MMHG | HEIGHT: 69 IN | HEART RATE: 78 BPM | TEMPERATURE: 97.8 F | DIASTOLIC BLOOD PRESSURE: 73 MMHG | WEIGHT: 203.4 LBS

## 2024-12-20 DIAGNOSIS — I83.12 VARICOSE VEINS OF LEFT LOWER EXTREMITY WITH INFLAMMATION: ICD-10-CM

## 2024-12-20 DIAGNOSIS — I10 ESSENTIAL HYPERTENSION: Primary | ICD-10-CM

## 2024-12-20 DIAGNOSIS — M79.662 PAIN IN LEFT LOWER LEG: ICD-10-CM

## 2024-12-20 PROBLEM — I83.90 VARICOSITIES OF LEG: Status: ACTIVE | Noted: 2024-12-20

## 2024-12-20 NOTE — PROGRESS NOTES
Chief Complaint  Leg Swelling (Left lower leg)    Subjective          Duane Hemphill presents to [facility]  History of Present Illness  --TOLERATING BLOOD PRESSURE MEDICATION WITHOUT APPARENT SIDE EFFECTS  --HIS LEFT LOWER LEG HAS HAD SWOLLEN AREAS HERE AND THERE FOR THE PAST COUPLE OF MONTHS WHICH COME AND GO AND ARE PAINFUL WHEN PRESENT.  THE LEG DOES NOT SWELL IN GENERAL HOWEVER.          No Known Allergies     There are no preventive care reminders to display for this patient.     Current Outpatient Medications on File Prior to Visit   Medication Sig    aspirin 81 MG EC tablet Take 1 tablet by mouth Daily.    atorvastatin (LIPITOR) 80 MG tablet TAKE 1 TABLET BY MOUTH ONCE DAILY    clopidogrel (PLAVIX) 75 MG tablet TAKE 1 TABLET BY MOUTH ONCE DAILY    lisinopril (PRINIVIL,ZESTRIL) 20 MG tablet TAKE 1 TABLET BY MOUTH ONCE DAILY    pantoprazole (PROTONIX) 40 MG EC tablet Take 1 tablet by mouth As Needed.     No current facility-administered medications on file prior to visit.       Immunization History   Administered Date(s) Administered    COVID-19 ([employer]) 05/21/2021, 11/11/2021    COVID-19 (PFIZER) 12YRS+ (COMIRNATY) 10/19/2023, 10/08/2024    FLUAD TRI 65YR+ 11/14/2019, 10/02/2024    Fluad Quad 65+ 09/29/2022, 10/17/2023    Fluzone (or Fluarix & Flulaval for VFC) >6mos 11/11/2021    Fluzone High-Dose 65+yrs 12/02/2020    Influenza, Unspecified 11/11/2021    Pneumococcal Conjugate 20-Valent (PCV20) 09/28/2023, 10/02/2024    Shingrix 01/12/2021, 01/26/2022    Tdap 08/09/2011       Review of Systems   Constitutional:  Negative for activity change, appetite change, chills, fatigue and fever.   HENT:  Negative for congestion, ear pain, rhinorrhea and sore throat.    Respiratory:  Negative for cough and shortness of breath.    Cardiovascular:  Negative for chest pain, palpitations and leg swelling.   Gastrointestinal:  Negative for abdominal pain, constipation, diarrhea, nausea and  "vomiting.   Musculoskeletal:  Negative for arthralgias and myalgias.   Neurological:  Negative for headache.        Objective     /73 (BP Location: Left arm, Patient Position: Sitting)   Pulse 78   Temp 97.8 °F (36.6 °C) (Oral)   Ht 175.3 cm (69\")   Wt 92.3 kg (203 lb 6.4 oz)   SpO2 98% Comment: on room air  BMI 30.04 kg/m²       Physical Exam  Vitals and nursing note reviewed.   Constitutional:       General: He is not in acute distress.     Appearance: Normal appearance.   Cardiovascular:      Rate and Rhythm: Normal rate and regular rhythm.      Heart sounds: Normal heart sounds. No murmur heard.  Pulmonary:      Effort: Pulmonary effort is normal.      Breath sounds: Normal breath sounds.   Abdominal:      Palpations: Abdomen is soft.      Tenderness: There is no abdominal tenderness.   Musculoskeletal:      Cervical back: Neck supple.      Right lower leg: No edema.      Left lower leg: No edema.      Comments: LEFT LOWER LEG APPEARS TO HAVE SOME BULGING VARICOSITIES WHICH ARE S/W TENDER BUT EASILY COMPRESSIBLE.  PULSES ARE OK.  AIMEE'S IS NEGATIVE. NO GNEERALIZED EDEMA OR ERYTHEMA.     Lymphadenopathy:      Cervical: No cervical adenopathy.   Neurological:      General: No focal deficit present.      Mental Status: He is alert.      Cranial Nerves: No cranial nerve deficit.      Coordination: Coordination normal.      Gait: Gait normal.   Psychiatric:         Mood and Affect: Mood normal.         Behavior: Behavior normal.         Result Review :                             Assessment and Plan      Diagnoses and all orders for this visit:    1. Essential hypertension (Primary)  Assessment & Plan:  Hypertension is stable and controlled  Continue current treatment regimen.  Blood pressure will be reassessed in 6 months.      2. Pain in left lower leg  -     Duplex Venous Lower Extremity - Left CAR; Future    3. Varicose veins of left lower extremity with inflammation  Assessment & Plan:  WILL OBTAIN " AN U/S AS NOTED AND ADVISE HIM TO DISCUSS THIS SITUATION WITH HIS VASCULAR SURGEON.  ELEVATION AND COMPRESSION STOCKINGS IN THE MEANTIME.      Orders:  -     Duplex Venous Lower Extremity - Left CAR; Future                    Follow Up     Return in about 6 months (around 6/20/2025).    Patient was given instructions and counseling regarding his condition or for health maintenance advice. Please see specific information pulled into the AVS if appropriate.

## 2024-12-20 NOTE — ASSESSMENT & PLAN NOTE
WILL OBTAIN AN U/S AS NOTED AND ADVISE HIM TO DISCUSS THIS SITUATION WITH HIS VASCULAR SURGEON.  ELEVATION AND COMPRESSION STOCKINGS IN THE MEANTIME.

## 2025-01-02 ENCOUNTER — TELEPHONE (OUTPATIENT)
Dept: FAMILY MEDICINE CLINIC | Age: 70
End: 2025-01-02
Payer: MEDICARE

## 2025-01-13 RX ORDER — LISINOPRIL 20 MG/1
20 TABLET ORAL DAILY
Qty: 90 TABLET | Refills: 0 | Status: SHIPPED | OUTPATIENT
Start: 2025-01-13

## 2025-01-15 ENCOUNTER — TELEPHONE (OUTPATIENT)
Dept: FAMILY MEDICINE CLINIC | Age: 70
End: 2025-01-15
Payer: MEDICARE

## 2025-01-15 NOTE — TELEPHONE ENCOUNTER
Caller: Billie Hemphill    Relationship: Emergency Contact    Best call back number: [phone]     What is the best time to reach you: ANYTIME     Who are you requesting to speak with (clinical staff, provider,  specific staff member): CLINICAL     What was the call regarding: PATIENT SPOUSE IS CALLING TO CLARIFY SOME ORDERS PLACED FOR THE PATIENT THAT IS OUTSTANDING AND LETTER THAT WAS RECEIVED.

## 2025-01-15 NOTE — TELEPHONE ENCOUNTER
She received a letter from WILIAN mcnulty for u/s of liver and kidneys.  She will also be receiving a letter from Dr Hinton about scheduling his u/s of his left leg.    Offered to give her the number to our scheduling dept and she said she is driving.  Scheduling number is included in both letters.

## 2025-01-27 ENCOUNTER — HOSPITAL ENCOUNTER (OUTPATIENT)
Dept: CARDIOLOGY | Facility: HOSPITAL | Age: 70
Discharge: HOME | End: 2025-01-27
Admitting: FAMILY MEDICINE
Payer: MEDICARE

## 2025-01-27 DIAGNOSIS — M79.662 PAIN IN LEFT LOWER LEG: ICD-10-CM

## 2025-01-27 DIAGNOSIS — I83.12 VARICOSE VEINS OF LEFT LOWER EXTREMITY WITH INFLAMMATION: ICD-10-CM

## 2025-01-27 LAB
BH CV LOWER VASCULAR LEFT COMMON FEMORAL AUGMENT: NORMAL
BH CV LOWER VASCULAR LEFT COMMON FEMORAL COMPETENT: NORMAL
BH CV LOWER VASCULAR LEFT COMMON FEMORAL COMPRESS: NORMAL
BH CV LOWER VASCULAR LEFT COMMON FEMORAL PHASIC: NORMAL
BH CV LOWER VASCULAR LEFT COMMON FEMORAL SPONT: NORMAL
BH CV LOWER VASCULAR LEFT DISTAL FEMORAL COMPRESS: NORMAL
BH CV LOWER VASCULAR LEFT GASTRONEMIUS COMPRESS: NORMAL
BH CV LOWER VASCULAR LEFT GREATER SAPH AK COMPRESS: NORMAL
BH CV LOWER VASCULAR LEFT GREATER SAPH BK COMPRESS: NORMAL
BH CV LOWER VASCULAR LEFT LESSER SAPH COMPRESS: NORMAL
BH CV LOWER VASCULAR LEFT MID FEMORAL AUGMENT: NORMAL
BH CV LOWER VASCULAR LEFT MID FEMORAL COMPETENT: NORMAL
BH CV LOWER VASCULAR LEFT MID FEMORAL COMPRESS: NORMAL
BH CV LOWER VASCULAR LEFT MID FEMORAL PHASIC: NORMAL
BH CV LOWER VASCULAR LEFT MID FEMORAL SPONT: NORMAL
BH CV LOWER VASCULAR LEFT PERONEAL COMPRESS: NORMAL
BH CV LOWER VASCULAR LEFT POPLITEAL AUGMENT: NORMAL
BH CV LOWER VASCULAR LEFT POPLITEAL COMPETENT: NORMAL
BH CV LOWER VASCULAR LEFT POPLITEAL COMPRESS: NORMAL
BH CV LOWER VASCULAR LEFT POPLITEAL PHASIC: NORMAL
BH CV LOWER VASCULAR LEFT POPLITEAL SPONT: NORMAL
BH CV LOWER VASCULAR LEFT POSTERIOR TIBIAL COMPRESS: NORMAL
BH CV LOWER VASCULAR LEFT PROXIMAL FEMORAL COMPRESS: NORMAL
BH CV LOWER VASCULAR LEFT SAPHENOFEMORAL JUNCTION COMPRESS: NORMAL
BH CV LOWER VASCULAR RIGHT COMMON FEMORAL AUGMENT: NORMAL
BH CV LOWER VASCULAR RIGHT COMMON FEMORAL COMPETENT: NORMAL
BH CV LOWER VASCULAR RIGHT COMMON FEMORAL COMPRESS: NORMAL
BH CV LOWER VASCULAR RIGHT COMMON FEMORAL PHASIC: NORMAL
BH CV LOWER VASCULAR RIGHT COMMON FEMORAL SPONT: NORMAL

## 2025-01-27 PROCEDURE — 93971 EXTREMITY STUDY: CPT | Performed by: SURGERY

## 2025-01-27 PROCEDURE — 93971 EXTREMITY STUDY: CPT

## 2025-02-06 RX ORDER — ATORVASTATIN CALCIUM 80 MG/1
80 TABLET, FILM COATED ORAL DAILY
Qty: 90 TABLET | Refills: 0 | Status: SHIPPED | OUTPATIENT
Start: 2025-02-06

## 2025-04-04 RX ORDER — LISINOPRIL 20 MG/1
20 TABLET ORAL DAILY
Qty: 90 TABLET | Refills: 0 | Status: SHIPPED | OUTPATIENT
Start: 2025-04-04

## 2025-04-28 RX ORDER — CLOPIDOGREL BISULFATE 75 MG/1
75 TABLET ORAL DAILY
Qty: 90 TABLET | Refills: 0 | Status: SHIPPED | OUTPATIENT
Start: 2025-04-28

## 2025-04-28 NOTE — TELEPHONE ENCOUNTER
Rx Refill Note  Requested Prescriptions     Pending Prescriptions Disp Refills    atorvastatin (LIPITOR) 80 MG tablet [Pharmacy Med Name: atorvastatin 80 mg tablet] 90 tablet 0     Sig: TAKE 1 TABLET BY MOUTH ONCE DAILY      Last office visit with prescribing clinician: 12/20/2024   Last telemedicine visit with prescribing clinician: Visit date not found   Next office visit with prescribing clinician: 4/30/2025    [facility]-CoA Reductase Inhibitors (Statins) Protocol Failed          Nancy Landa LPN  04/28/25, 12:34 EDT

## 2025-04-29 RX ORDER — ATORVASTATIN CALCIUM 80 MG/1
80 TABLET, FILM COATED ORAL DAILY
Qty: 90 TABLET | Refills: 3 | Status: SHIPPED | OUTPATIENT
Start: 2025-04-29

## 2025-04-30 ENCOUNTER — OFFICE VISIT (OUTPATIENT)
Dept: FAMILY MEDICINE CLINIC | Age: 70
End: 2025-04-30
Payer: MEDICARE

## 2025-04-30 ENCOUNTER — LAB (OUTPATIENT)
Dept: LAB | Facility: HOSPITAL | Age: 70
End: 2025-04-30
Payer: MEDICARE

## 2025-04-30 VITALS
HEART RATE: 56 BPM | BODY MASS INDEX: 30.01 KG/M2 | WEIGHT: 202.6 LBS | OXYGEN SATURATION: 98 % | DIASTOLIC BLOOD PRESSURE: 94 MMHG | HEIGHT: 69 IN | SYSTOLIC BLOOD PRESSURE: 138 MMHG | TEMPERATURE: 98.1 F

## 2025-04-30 DIAGNOSIS — Z00.00 MEDICARE ANNUAL WELLNESS VISIT, SUBSEQUENT: Primary | ICD-10-CM

## 2025-04-30 DIAGNOSIS — K21.9 GERD WITHOUT ESOPHAGITIS: ICD-10-CM

## 2025-04-30 DIAGNOSIS — E78.00 HIGH CHOLESTEROL: ICD-10-CM

## 2025-04-30 DIAGNOSIS — Z12.5 SCREENING FOR PROSTATE CANCER: ICD-10-CM

## 2025-04-30 DIAGNOSIS — I10 ESSENTIAL HYPERTENSION: ICD-10-CM

## 2025-04-30 PROBLEM — N52.8 OTHER MALE ERECTILE DYSFUNCTION: Status: ACTIVE | Noted: 2025-04-30

## 2025-04-30 PROBLEM — M79.674 TOE PAIN, RIGHT: Status: RESOLVED | Noted: 2024-04-24 | Resolved: 2025-04-30

## 2025-04-30 PROBLEM — N52.8 OTHER MALE ERECTILE DYSFUNCTION: Status: RESOLVED | Noted: 2025-04-30 | Resolved: 2025-04-30

## 2025-04-30 LAB
ALBUMIN SERPL-MCNC: 4.6 G/DL (ref 3.5–5.2)
ALBUMIN/GLOB SERPL: 1.8 G/DL
ALP SERPL-CCNC: 107 U/L (ref 39–117)
ALT SERPL W P-5'-P-CCNC: 55 U/L (ref 1–41)
ANION GAP SERPL CALCULATED.3IONS-SCNC: 11.3 MMOL/L (ref 5–15)
AST SERPL-CCNC: 46 U/L (ref 1–40)
BILIRUB SERPL-MCNC: 0.8 MG/DL (ref 0–1.2)
BUN SERPL-MCNC: 23 MG/DL (ref 8–23)
BUN/CREAT SERPL: 16.2 (ref 7–25)
CALCIUM SPEC-SCNC: 9.2 MG/DL (ref 8.6–10.5)
CHLORIDE SERPL-SCNC: 105 MMOL/L (ref 98–107)
CHOLEST SERPL-MCNC: 150 MG/DL (ref 0–200)
CO2 SERPL-SCNC: 19.7 MMOL/L (ref 22–29)
CREAT SERPL-MCNC: 1.42 MG/DL (ref 0.76–1.27)
DEPRECATED RDW RBC AUTO: 44.2 FL (ref 37–54)
EGFRCR SERPLBLD CKD-EPI 2021: 52.8 ML/MIN/1.73
ERYTHROCYTE [DISTWIDTH] IN BLOOD BY AUTOMATED COUNT: 12.2 % (ref 12.3–15.4)
GLOBULIN UR ELPH-MCNC: 2.5 GM/DL
GLUCOSE SERPL-MCNC: 96 MG/DL (ref 65–99)
HCT VFR BLD AUTO: 47.1 % (ref 37.5–51)
HDLC SERPL-MCNC: 34 MG/DL (ref 40–60)
HGB BLD-MCNC: 16.4 G/DL (ref 13–17.7)
LDLC SERPL CALC-MCNC: 85 MG/DL (ref 0–100)
LDLC/HDLC SERPL: 2.37 {RATIO}
MCH RBC QN AUTO: 33.7 PG (ref 26.6–33)
MCHC RBC AUTO-ENTMCNC: 34.8 G/DL (ref 31.5–35.7)
MCV RBC AUTO: 96.9 FL (ref 79–97)
PLATELET # BLD AUTO: 267 10*3/MM3 (ref 140–450)
PMV BLD AUTO: 10.7 FL (ref 6–12)
POTASSIUM SERPL-SCNC: 4.5 MMOL/L (ref 3.5–5.2)
PROT SERPL-MCNC: 7.1 G/DL (ref 6–8.5)
PSA SERPL-MCNC: 2.47 NG/ML (ref 0–4)
RBC # BLD AUTO: 4.86 10*6/MM3 (ref 4.14–5.8)
SODIUM SERPL-SCNC: 136 MMOL/L (ref 136–145)
TRIGL SERPL-MCNC: 177 MG/DL (ref 0–150)
TSH SERPL DL<=0.05 MIU/L-ACNC: 2.5 UIU/ML (ref 0.27–4.2)
VLDLC SERPL-MCNC: 31 MG/DL (ref 5–40)
WBC NRBC COR # BLD AUTO: 7.09 10*3/MM3 (ref 3.4–10.8)

## 2025-04-30 PROCEDURE — 80061 LIPID PANEL: CPT | Performed by: FAMILY MEDICINE

## 2025-04-30 PROCEDURE — G0103 PSA SCREENING: HCPCS | Performed by: FAMILY MEDICINE

## 2025-04-30 PROCEDURE — 84443 ASSAY THYROID STIM HORMONE: CPT | Performed by: FAMILY MEDICINE

## 2025-04-30 PROCEDURE — 80053 COMPREHEN METABOLIC PANEL: CPT | Performed by: FAMILY MEDICINE

## 2025-04-30 PROCEDURE — 85027 COMPLETE CBC AUTOMATED: CPT | Performed by: FAMILY MEDICINE

## 2025-04-30 PROCEDURE — 36415 COLL VENOUS BLD VENIPUNCTURE: CPT | Performed by: FAMILY MEDICINE

## 2025-04-30 NOTE — ASSESSMENT & PLAN NOTE
Hypertension is stable and controlled  Continue current treatment regimen.  Blood pressure will be reassessed in 6 months.    Orders:    TSH Rfx On Abnormal To Free T4

## 2025-04-30 NOTE — PROGRESS NOTES
Subjective   The ABCs of the Annual Wellness Visit  Medicare Wellness Visit      Duane Hemphill is a 69 y.o. patient who presents for a Medicare Wellness Visit.    The following portions of the patient's history were reviewed and   updated as appropriate: allergies, current medications, past family history, past medical history, past social history, past surgical history, and problem list.    Compared to one year ago, the patient's physical   health is the same.  Compared to one year ago, the patient's mental   health is the same.    Recent Hospitalizations:  He was not admitted to the hospital during the last year.     Current Medical Providers:  Patient Care Team:  Ricky Hinton MD as PCP - General (Family Medicine)  Handy Freeman MD as Surgeon (Orthopedic Surgery)  Saurav Rasheed MD as Surgeon (Neurosurgery)  Quinn Mg MD (Vascular Surgery)    Outpatient Medications Prior to Visit   Medication Sig Dispense Refill    aspirin 81 MG EC tablet Take 1 tablet by mouth Daily.      atorvastatin (LIPITOR) 80 MG tablet TAKE 1 TABLET BY MOUTH ONCE DAILY 90 tablet 3    clopidogrel (PLAVIX) 75 MG tablet TAKE 1 TABLET BY MOUTH ONCE DAILY 90 tablet 0    lisinopril (PRINIVIL,ZESTRIL) 20 MG tablet TAKE 1 TABLET BY MOUTH ONCE DAILY 90 tablet 0    pantoprazole (PROTONIX) 40 MG EC tablet Take 1 tablet by mouth As Needed.       No facility-administered medications prior to visit.     No opioid medication identified on active medication list. I have reviewed chart for other potential  high risk medication/s and harmful drug interactions in the elderly.      Aspirin is on active medication list. Aspirin use is indicated based on review of current medical condition/s. Pros and cons of this therapy have been discussed today. Benefits of this medication outweigh potential harm.  Patient has been encouraged to continue taking this medication.  .      Patient Active Problem List   Diagnosis    Essential  "hypertension    History of TIA (transient ischemic attack)    Bilateral carotid artery stenosis (s/p right endarterectomy)     High cholesterol    GERD without esophagitis    Hearing difficulty, bilateral (hearing aids)    Dizziness (brief, intermittent)    Medicare annual wellness visit, subsequent    Stage 3a chronic kidney disease    Venous insufficiency    Varicosities of leg     Advance Care Planning Advance Directive is not on file.  ACP discussion was held with the patient during this visit. Patient does not have an advance directive, information provided.            Objective   Vitals:    04/30/25 0758   BP: 138/94   BP Location: Left arm   Patient Position: Sitting   Pulse: 56   Temp: 98.1 °F (36.7 °C)   TempSrc: Oral   SpO2: 98%  Comment: on RA   Weight: 91.9 kg (202 lb 9.6 oz)   Height: 175.3 cm (69\")   PainSc: 0-No pain       Estimated body mass index is 29.92 kg/m² as calculated from the following:    Height as of this encounter: 175.3 cm (69\").    Weight as of this encounter: 91.9 kg (202 lb 9.6 oz).    BMI is >= 25 and <30. (Overweight) The following options were offered after discussion;: nutrition counseling/recommendations           Does the patient have evidence of cognitive impairment? No                                                                                                Health  Risk Assessment    Smoking Status:  Social History     Tobacco Use   Smoking Status Former    Types: Cigars    Quit date: 2022    Years since quitting: 3.3   Smokeless Tobacco Never   Tobacco Comments    CHEWS ON A CIGAR     Alcohol Consumption:  Social History     Substance and Sexual Activity   Alcohol Use Yes    Comment: AN OCC GLASS OF BEER       Fall Risk Screen  STEADI Fall Risk Assessment was completed, and patient is at LOW risk for falls.Assessment completed on:4/30/2025    Depression Screening   Little interest or pleasure in doing things? Not at all   Feeling down, depressed, or hopeless? Not at all "   PHQ-2 Total Score 0      Health Habits and Functional and Cognitive Screenin/30/2025     7:56 AM   Functional & Cognitive Status   Do you have difficulty preparing food and eating? No   Do you have difficulty bathing yourself, getting dressed or grooming yourself? No   Do you have difficulty using the toilet? No   Do you have difficulty moving around from place to place? No   Do you have trouble with steps or getting out of a bed or a chair? No   Current Diet Well Balanced Diet   Dental Exam Up to date   Eye Exam Up to date   Exercise (times per week) 7 times per week   Current Exercises Include Other;Walking;Yard Work   Do you need help using the phone?  No   Are you deaf or do you have serious difficulty hearing?  Yes   Do you need help to go to places out of walking distance? No   Do you need help shopping? No   Do you need help preparing meals?  No   Do you need help with housework?  No   Do you need help with laundry? No   Do you need help taking your medications? No   Do you need help managing money? No   Do you ever drive or ride in a car without wearing a seat belt? No   Have you felt unusual stress, anger or loneliness in the last month? No   Who do you live with? Spouse   If you need help, do you have trouble finding someone available to you? No   Have you been bothered in the last four weeks by sexual problems? No   Do you have difficulty concentrating, remembering or making decisions? No           Age-appropriate Screening Schedule:  Refer to the list below for future screening recommendations based on patient's age, sex and/or medical conditions. Orders for these recommended tests are listed in the plan section. The patient has been provided with a written plan.    Health Maintenance List  Health Maintenance   Topic Date Due    COVID-19 Vaccine ( season) 2025    LIPID PANEL  2025    TDAP/TD VACCINES (2 - Td or Tdap) 2025 (Originally 2021)    INFLUENZA VACCINE   07/01/2025    ANNUAL WELLNESS VISIT  04/30/2026    COLORECTAL CANCER SCREENING  04/22/2028    HEPATITIS C SCREENING  Completed    Pneumococcal Vaccine 50+  Completed    AAA SCREEN ONCE  Completed    ZOSTER VACCINE  Completed                                                                                                                                                CMS Preventative Services Quick Reference  Risk Factors Identified During Encounter  None Identified    The above risks/problems have been discussed with the patient.  Pertinent information has been shared with the patient in the After Visit Summary.  An After Visit Summary and PPPS were made available to the patient.    Follow Up:   Next Medicare Wellness visit to be scheduled in 1 year.         Additional E&M Note during same encounter follows:  Patient has additional, significant, and separately identifiable condition(s)/problem(s) that require work above and beyond the Medicare Wellness Visit     Chief Complaint  Medicare Wellness-subsequent    Subjective   --TOLERATING BLOOD PRESSURE MEDICATION WITHOUT APPARENT SIDE EFFECTS  --GERD IS WELL CONTROLLED WITH THE PPI  --LAST LIPIDS WERE OK, NO ISSUES WITH THE STATIN           Review of Systems   Constitutional:  Negative for activity change, appetite change, chills, fatigue and fever.   HENT:  Negative for congestion, ear pain, hearing loss, rhinorrhea and sore throat.    Eyes:  Negative for discharge and visual disturbance.   Respiratory:  Negative for cough and shortness of breath.    Cardiovascular:  Negative for chest pain, palpitations and leg swelling.   Gastrointestinal:  Negative for abdominal pain, nausea and vomiting.   Genitourinary:  Negative for dysuria and hematuria.   Musculoskeletal:  Negative for arthralgias and myalgias.   Psychiatric/Behavioral:  Negative for dysphoric mood.               Objective   Vital Signs:  /94 (BP Location: Left arm, Patient Position: Sitting)   Pulse 56  "  Temp 98.1 °F (36.7 °C) (Oral)   Ht 175.3 cm (69\")   Wt 91.9 kg (202 lb 9.6 oz)   SpO2 98% Comment: on RA  BMI 29.92 kg/m²   Physical Exam  Vitals and nursing note reviewed.   Constitutional:       General: He is not in acute distress.     Appearance: Normal appearance.   HENT:      Right Ear: Tympanic membrane normal.      Left Ear: Tympanic membrane normal.      Mouth/Throat:      Pharynx: Oropharynx is clear.   Eyes:      Conjunctiva/sclera: Conjunctivae normal.   Cardiovascular:      Rate and Rhythm: Normal rate and regular rhythm.      Heart sounds: Normal heart sounds. No murmur heard.  Pulmonary:      Effort: Pulmonary effort is normal.      Breath sounds: Normal breath sounds.   Abdominal:      General: Bowel sounds are normal.      Palpations: Abdomen is soft.      Tenderness: There is no abdominal tenderness.   Musculoskeletal:      Cervical back: Neck supple.      Right lower leg: No edema.      Left lower leg: No edema.   Lymphadenopathy:      Cervical: No cervical adenopathy.   Neurological:      General: No focal deficit present.      Mental Status: He is alert.      Cranial Nerves: No cranial nerve deficit.      Coordination: Coordination normal.      Gait: Gait normal.   Psychiatric:         Mood and Affect: Mood normal.         Behavior: Behavior normal.                    Assessment and Plan      Essential hypertension  Hypertension is stable and controlled  Continue current treatment regimen.  Blood pressure will be reassessed in 6 months.    Orders:    TSH Rfx On Abnormal To Free T4    GERD without esophagitis  IMPROVED WITH CURRENT TREATMENT, WILL REEVALUATE AT NEXT VISIT     Orders:    CBC (No Diff)    High cholesterol   Lipid abnormalities are stable    Plan:  Continue same medication/s without change.      Discussed medication dosage, use, side effects, and goals of treatment in detail.    Counseled patient on lifestyle modifications to help control hyperlipidemia.     Patient Treatment " Goals:   LDL goal is less than 70    Followup in 6 months.    Orders:    Comprehensive Metabolic Panel    Lipid Panel    Medicare annual wellness visit, subsequent  ADVICE GIVEN RE:  SEATBELT USE, ALCOHOL USE, HEALTHY DIET, ROUTINE EYE AND DENTAL EXAM, ROUTINE VACCINATIONS.           Screening for prostate cancer    Orders:    PSA Screen            Follow Up   Return in about 6 months (around 10/30/2025).  Patient was given instructions and counseling regarding his condition or for health maintenance advice. Please see specific information pulled into the AVS if appropriate.

## 2025-06-24 RX ORDER — LISINOPRIL 20 MG/1
20 TABLET ORAL DAILY
Qty: 90 TABLET | Refills: 1 | Status: SHIPPED | OUTPATIENT
Start: 2025-06-24

## 2025-08-08 RX ORDER — CLOPIDOGREL BISULFATE 75 MG/1
75 TABLET ORAL DAILY
Qty: 90 TABLET | Refills: 0 | Status: SHIPPED | OUTPATIENT
Start: 2025-08-08

## (undated) DEVICE — DRP C/ARM 41X74IN

## (undated) DEVICE — PIN, 9733235, 100MM, STERILE, PERC REF

## (undated) DEVICE — APPL CHLORAPREP W/TINT 26ML ORNG

## (undated) DEVICE — PAD,ABDOMINAL,8"X10",ST,LF: Brand: MEDLINE

## (undated) DEVICE — GOWN,NON-REINFORCED,SIRUS,SET IN SLV,XXL: Brand: MEDLINE

## (undated) DEVICE — GUIDEWIRE 2345050 BLUNT THREADED 24IN

## (undated) DEVICE — 3.0MM PRECISION NEURO (MATCH HEAD)

## (undated) DEVICE — TOWEL,OR,DSP,ST,BLUE,STD,4/PK,20PK/CS: Brand: MEDLINE

## (undated) DEVICE — 3M™ IOBAN™ 2 ANTIMICROBIAL INCISE DRAPE 6650EZ: Brand: IOBAN™ 2

## (undated) DEVICE — DRN JP FLT NO TROC SIL FUL/PERF 7MM

## (undated) DEVICE — SYR CONTRL LUERLOK 10CC

## (undated) DEVICE — TOTAL TRAY, 16FR 10ML SIL FOLEY, URN: Brand: MEDLINE

## (undated) DEVICE — 6.0MM PRECISION ROUND

## (undated) DEVICE — PK ATS CUST W CARDIOTOMY RESEVOIR

## (undated) DEVICE — GAUZE,SPONGE,4"X4",16PLY,XRAY,STRL,LF: Brand: MEDLINE

## (undated) DEVICE — ANTIBACTERIAL UNDYED BRAIDED (POLYGLACTIN 910), SYNTHETIC ABSORBABLE SUTURE: Brand: COATED VICRYL

## (undated) DEVICE — 3M™ STERI-STRIP™ REINFORCED ADHESIVE SKIN CLOSURES, R1547, 1/2 IN X 4 IN (12 MM X 100 MM), 6 STRIPS/ENVELOPE: Brand: 3M™ STERI-STRIP™

## (undated) DEVICE — GLV SURG PREMIERPRO ORTHO LTX PF SZ8 BRN

## (undated) DEVICE — NDL SPINE 22G 31/2IN BLK

## (undated) DEVICE — OIL CARTRIDGE: Brand: CORE, MAESTRO

## (undated) DEVICE — PK NEURO SPINE 40

## (undated) DEVICE — JACKSON-PRATT 100CC BULB RESERVOIR: Brand: CARDINAL HEALTH

## (undated) DEVICE — DRP MICROSCOPE 4 BINOCULAR CV 54X150IN

## (undated) DEVICE — DIFFUSER: Brand: CORE, MAESTRO

## (undated) DEVICE — DISPOSABLE BIPOLAR FORCEPS 7 3/4" (19.7CM) SCOVILLE BAYONET, 1.5MM TIP AND 12 FT. (3.6M) CABLE: Brand: KIRWAN

## (undated) DEVICE — MARKR SKIN W/RULR AND LBL